# Patient Record
Sex: MALE | Race: BLACK OR AFRICAN AMERICAN
[De-identification: names, ages, dates, MRNs, and addresses within clinical notes are randomized per-mention and may not be internally consistent; named-entity substitution may affect disease eponyms.]

---

## 2017-03-24 NOTE — EMERGENCY ROOM REPORT
History of Present Illness


General


Chief Complaint:  Dyspnea/Respdistress


Source:  Patient





Present Illness


HPI


The patient presents with dyspnea.  He has a history of COPD and has been 

coughing and short of breath.  Does complain about some substernal chest 

pressure 2/10, not radiate.  He states he took a handful of all his medications 

including his diabetes medicine this morning.  He feels a little bit better at 

this time.  He has a dry mouth and feels dizzy when he stands.  Not on home O2.





The patient is taking prednisone 5 mg and they've been unable to taper down 

lower than that.  There is trouble controlling his blood sugar when he takes 

higher doses but needs to do this for treatment of his COPD.  He does have a 

nebulizer at home.





No fevers or chills.  No NVD.  No rashes.  No joint pain, headache.  No 

depression.





Some dysuria - feels like he needs to urinate but produces little urine.  No 

hematuria.


Allergies:  


Coded Allergies:  


     SHARK LIVER OIL (Unverified  Allergy, Unknown, 8/6/16)


Uncoded Allergies:  


     SHARK (Allergy, Mild, RASH, 4/11/16)





Patient History


Past Medical History:  see triage record


Social History:  Reports: smoking - prior


Social History Narrative


at home


Reviewed Nursing Documentation:  PMH: Agreed, PSxH: Agreed





Nursing Documentation-PMH


Hx Cardiac Problems:  Yes


Hx Asthma:  Yes


Hx COPD:  Yes


Hx Diabetes:  Yes


Hx Cancer:  No


Hx Gastrointestinal Problems:  Yes - IBS


Hx Neurological Problems:  No


Hx Cerebrovascular Accident:  No - shingles





Review of Systems


All Other Systems:  negative except mentioned in HPI





Physical Exam





Vital Signs








  Date Time  Temp Pulse Resp B/P Pulse Ox O2 Delivery O2 Flow Rate FiO2


 


3/24/17 11:30 97.5 96 22 93/65 96 Room Air  


 


3/24/17 11:46        40








Sp02 EP Interpretation:  reviewed, normal


General Appearance:  well appearing, no apparent distress, GCS 15


Head:  normocephalic


Eyes:  bilateral eye PERRL, bilateral eye normal inspection


ENT:  dry mucus membranes - slightly dry


Neck:  supple


Respiratory:  chest non-tender, no respiratory distress, wheezing, expiration


Cardiovascular #1:  regular rate, rhythm


Cardiovascular #2:  2+ radial (R)


Gastrointestinal:  normal inspection, normal bowel sounds, non tender, no mass, 

non-distended


Musculoskeletal:  back normal, gait/station normal, normal range of motion


Neurologic:  alert, oriented x3, grossly normal


Psychiatric:  mood/affect normal


Skin:  normal inspection, warm/dry, other - plethoric





Medical Decision Making


Diagnostic Impression:  


 Primary Impression:  


 COPD (chronic obstructive pulmonary disease)


 Qualified Codes:  J44.1 - Chronic obstructive pulmonary disease with (acute) 

exacerbation


 Additional Impressions:  


 Diabetes


 Qualified Codes:  E11.9 - Type 2 diabetes mellitus without complications


 UTI (urinary tract infection)


 Qualified Codes:  N30.00 - Acute cystitis without hematuria


ER Course


Patient presents with chest pain dyspnea and wheezing.  Differential includes 

acute myocardial infarction, acute coronary syndrome, COPD exacerbation, 

pneumonia, dehydration and hyperglycemia.  Emergent evaluation EKG chest x-ray 

and labs be undertaken.  In addition the patient will receive IV hydration, 

salmeterol and breathing treatments.





Labs with normal WBC.  Pyuria.  EKG no acute injury.  Normal troponin.





Antibiotics begun for UTI.





The patient still is dyspneic but improved.  Patient needs observation after 

discussion with Dr. Hernandez led to his request we admit the patient to the hospital.





Discussed with Dr. Martinez.





Laboratory Tests








Test


  3/24/17


12:16 3/24/17


12:52


 


Urine Color Yellow   


 


Urine Appearance Clear   


 


Urine pH 5 (4.5-8.0)   


 


Urine Specific Gravity


  1.025


(1.005-1.035) 


 


 


Urine Protein


  Negative


(NEGATIVE) 


 


 


Urine Glucose (UA)


  Negative


(NEGATIVE) 


 


 


Urine Ketones


  Negative


(NEGATIVE) 


 


 


Urine Occult Blood


  Negative


(NEGATIVE) 


 


 


Urine Nitrite


  Positive


(NEGATIVE)  H 


 


 


Urine Bilirubin


  Negative


(NEGATIVE) 


 


 


Urine Urobilinogen


  Normal MG/DL


(0.0-1.0) 


 


 


Urine Leukocyte Esterase


  3+ (NEGATIVE)


H 


 


 


Urine RBC


  0-2 /HPF (0 -


0)  H 


 


 


Urine WBC


  15-20 /HPF (0


- 0)  H 


 


 


Urine Squamous Epithelial


Cells Occasional


/LPF 


 


 


Urine Bacteria


  Many /HPF


(NONE)  H 


 


 


White Blood Count


  


  4.1 K/UL


(4.8-10.8)  L


 


Red Blood Count


  


  4.80 M/UL


(4.70-6.10)


 


Hemoglobin


  


  15.0 G/DL


(14.2-18.0)


 


Hematocrit


  


  44.9 %


(42.0-52.0)


 


Mean Corpuscular Volume  94 FL (80-99)  


 


Mean Corpuscular Hemoglobin


  


  31.1 PG


(27.0-31.0)  H


 


Mean Corpuscular Hemoglobin


Concent 


  33.3 G/DL


(32.0-36.0)


 


Red Cell Distribution Width


  


  12.4 %


(11.6-14.8)


 


Platelet Count


  


  257 K/UL


(150-450)


 


Mean Platelet Volume


  


  6.5 FL


(6.5-10.1)


 


Neutrophils (%) (Auto)


  


  44.9 %


(45.0-75.0)  L


 


Lymphocytes (%) (Auto)


  


  34.4 %


(20.0-45.0)


 


Monocytes (%) (Auto)


  


  13.1 %


(1.0-10.0)  H


 


Eosinophils (%) (Auto)


  


  6.2 %


(0.0-3.0)  H


 


Basophils (%) (Auto)


  


  1.4 %


(0.0-2.0)


 


Prothrombin Time


  


  10.2 SEC


(9.30-11.50)


 


Prothrombin Time INR  1.0 (0.9-1.1)  


 


PTT


  


  30 SEC (23-33)


 


 


Sodium Level


  


  142 mEQ/L


(135-145)


 


Potassium Level


  


  4.0 mEQ/L


(3.4-4.9)


 


Chloride Level


  


  101 mEQ/L


()


 


Carbon Dioxide Level


  


  26 mEQ/L


(20-30)


 


Anion Gap  15 (5-15)  


 


Blood Urea Nitrogen


  


  7 mg/dL (7-23)


 


 


Creatinine


  


  1.0 mg/dL


(0.7-1.2)


 


Estimate Glomerular


Filtration Rate 


  > 60 mL/min


(>60)


 


Glucose Level


  


  171 mg/dL


()  H


 


Lactic Acid Level


  


  2.70 mmol/L


(0.66-2.22)  H


 


Calcium Level


  


  9.4 mg/dL


(8.6-10.2)


 


Total Bilirubin


  


  < 0.2 mg/dL


(0.0-1.2)


 


Aspartate Amino Transferase


(AST) 


  37 U/L (5-40)  


 


 


Alanine Aminotransferase (ALT)  38 U/L (3-41)  


 


Alkaline Phosphatase


  


  53 U/L


()


 


Total Creatine Kinase


  


  83 U/L


()


 


Troponin I


  


  < 0.30 ng/mL


(<=0.30)


 


Pro-B-Type Natriuretic Peptide


  


  141 pg/mL


(0-125)  H


 


Total Protein


  


  6.6 g/dL


(6.6-8.7)


 


Albumin


  


  3.4 g/dL


(3.5-5.2)  L


 


Globulin  3.2 g/dL  


 


Albumin/Globulin Ratio  1.0 (1.0-2.7)  








Microbiology








 Date/Time


Source Procedure


Growth Status


 


 


 3/24/17 12:52


Nasal Nares Influenza Types A,B Antigen (SHANELL) - Final Complete








EKG Diagnostic Results


Rate:  normal


Rhythm:  NSR


ST Segments:  no acute changes - bifascicular block





Rhythm Strip Diag. Results


EP Interpretation:  yes


Rhythm:  NSR, no PVC's, no ectopy





Chest X-Ray Diagnostic Results


EP Interpretation:  Yes


Findings:  no effusion, no pneumothorax, other - scoleosis, COPD


Number of Views:  1





Last Vital Signs








  Date Time  Temp Pulse Resp B/P Pulse Ox O2 Delivery O2 Flow Rate FiO2


 


3/24/17 16:18 97.5 67 21 118/78 98 Room Air  40








Status:  improved


Disposition:  ADMITTED AS INPATIENT


Condition:  Serious


Referrals:  


MENA HERNANDEZ (PCP)











Robert De La Fuente M.D. Mar 24, 2017 14:41

## 2017-03-25 NOTE — HISTORY AND PHYSICAL
History of Present Illness


General


Reason for Hospitalization:  Dyspnea/Respdistress





Present Illness


Allergies:  


Coded Allergies:  


     SHARK LIVER OIL (Unverified  Allergy, Unknown, 8/6/16)


Uncoded Allergies:  


     SHARK (Allergy, Mild, RASH, 4/11/16)





Medication History


Scheduled


Amantadine Hcl* (Symmetrel*), 100 MG ORAL DAILY, (Reported)


Aspirin* (Aspirin*), 81 MG ORAL DAILY, (Reported)


Clarithromycin (Clarithromycin), 500 MG ORAL TWICE A DAY, (Reported)


Cyclobenzaprine Hcl (Amrix), 15 MG ORAL DAILY, (Reported)


Dutasteride (Avodart), 0.5 MG ORAL QHS, (Reported)


Escitalopram Oxalate* (Lexapro*), 20 MG ORAL DAILY, (Reported)


Furosemide* (Lasix*), 20 MG ORAL DAILY, (Reported)


Gabapentin* (Gabapentin*), 600 MG ORAL THREE TIMES A DAY, (Reported)


Glycopyrrolate/Formoterol Fum (Bevespi Aerosphere Inhaler), 2 PUFFS IH BID, (

Reported)


Insulin Degludec (Tresiba Flextouch U-200), 30 UNIT SQ QHS, (Reported)


Linaclotide (Linzess), 145 MCG PO DAILY, (Reported)


Metformin Hcl (Metformin Hcl Er), 500 MG ORAL DAILY, (Reported)


Metoprolol Tartrate* (Metoprolol Tartrate*), 25 MG ORAL DAILY, (Reported)


Omeprazole (Omeprazole), 20 MG ORAL DAILY, (Reported)


Potassium Chloride* (K-Dur*), 10 MEQ ORAL DAILY, (Reported)


Prednisone (Prednisone), 5 MG PO DAILY, (Reported)


Roflumilast (Daliresp), 500 MCG PO DAILY, (Reported)


Suvorexant (Belsomra), 20 MG PO QHS, (Reported)


Tamsulosin Hcl (Tamsulosin Hcl*), 0.8 MG ORAL BEDTIME, (Reported)


Theophylline Anhydrous (Theophylline Anhydrous), 300 MG PO TID, (Reported)





Scheduled PRN


Albuterol Sulfate (Ventolin Hfa), 2 PUFFS INH EVERY 6 HOURS PRN for Shortness 

of Breath, (Reported)


Aspirin/Acetaminophen/Caffeine (Excedrin Extra Strength Caplet), 2 TAB PO DAILY 

PRN for For Pain, (Reported)


Insulin Aspart (Novolog Flexpen), SQ TID PRN for SLIDING SCALE, (Reported)


Mometasone Furoate (Nasonex), 2 SPRAYS NASAL DAILY PRN for ALLERGIES, (Reported)


Oxycodone Hcl* (Roxicodone*), 30 MG ORAL Q8HR PRN for FOR PAIN (SEE PT COMMENTS)

, (Reported)





Discontinued Medications


Insulin Glargine (Lantus), 10 SUBQ BEDTIME, (Reported)


   Discontinued Reason: Medication dose changed


Montelukast Sodium* (Singulair*), 10 MG PO DAILY, (Reported)


   Discontinued Reason: Pt stopped taking med


Prednisone* (Prednisone*), 10 MG ORAL DAILY, (Reported)


   Discontinued Reason: Prescription changed


Simvastatin (Zocor), 80 MG ORAL BEDTIME, (Reported)


   Discontinued Reason: Pt stopped taking med


Theophylline (Theophylline Anhydrous), 300 MG PO THREE TIMES A DAY, (Reported)


   Discontinued Reason: Prescription changed


Tiotropium Bromide* (Spiriva*), Unknown Dose INH DAILY, (Reported)


   Discontinued Reason: Pt stopped taking med


Zolpidem Tartrate* (Ambien*), 10 MG ORAL HS PRN for Insomnia, (Reported)


   Discontinued Reason: Pt stopped taking med





Patient History


Healthcare decision maker





Resuscitation status


Full Code


Advanced Directive on File


No





Physical Exam





Last 24 Hour Vital Signs








  Date Time  Temp Pulse Resp B/P Pulse Ox O2 Delivery O2 Flow Rate FiO2


 


3/25/17 19:00 97.2 73 20 132/88 98 Room Air  


 


3/25/17 16:00 97.5 78 20 139/82 99 Room Air  


 


3/25/17 14:30  73 16  95 Room Air  21


 


3/25/17 11:28 97.8 79 20 126/80 97 Room Air  


 


3/25/17 11:20  68 16  97 Room Air  21


 


3/25/17 09:24  74  122/83    


 


3/25/17 08:04 97.6 74 19 122/83 99 Room Air  


 


3/25/17 07:53  79 17   Room Air  21


 


3/25/17 07:53  79 17  93 Room Air  21


 


3/25/17 07:53        21


 


3/25/17 04:00 97.2 73 22 131/78 99 Room Air  


 


3/25/17 00:00 97.7 75 20 126/81 97 Room Air  

















Intake and Output  


 


 3/24/17 3/25/17





 19:00 07:00


 


Intake Total  470.0 ml


 


Output Total 20 ml 725 ml


 


Balance -20 ml -255.0 ml


 


  


 


Intake Oral  360 ml


 


IV Total  110.0 ml


 


Output Urine Total 20 ml 725 ml


 


# Voids  3








Height (Feet):  5


Height (Inches):  11.00


Weight (Pounds):  170


Medications





Current Medications








 Medications


  (Trade)  Dose


 Ordered  Sig/Amina


 Route


 PRN Reason  Start Time


 Stop Time Status Last Admin


Dose Admin


 


 Albuterol/


 Ipratropium


  (DuoNeb


 0.5-3(2.5)mg/3ml)  3 ml  Q4H  PRN


 HHN


 dyspnea  3/24/17 21:00


 3/29/17 20:59   


 


 


 Amantadine HCl


  (Symmetrel)  100 mg  DAILY


 ORAL


   3/25/17 09:00


 4/24/17 08:59  3/25/17 09:23


 


 


 Dextrose


  (Dextrose 50%)    STAT  PRN


 IV


 Hypoglycemia  3/24/17 21:00


 4/23/17 20:59   


 


 


 Furosemide


  (Lasix)  20 mg  DAILY


 ORAL


   3/25/17 09:00


 4/24/17 08:59  3/25/17 09:24


 


 


 Heparin Sodium


  (Porcine)


  (Heparin 5000


 units/ml)  5,000 units  EVERY 12  HOURS


 SUBQ


   3/24/17 22:00


 4/23/17 21:59  3/25/17 21:14


 


 


 Insulin Aspart


  (NovoLOG)    BEFORE MEALS AND  HS


 SUBQ


   3/24/17 23:00


 4/23/17 22:59  3/25/17 21:25


 


 


 Ketorolac


 Tromethamine


  (Toradol 30mg)  30 mg  Q8H  PRN


 IV


 moderate pain 4-6  3/24/17 21:00


 3/29/17 20:59   


 


 


 Lorazepam


  (Ativan 2mg/ml


 1ml)  0.5 mg  Q4H  PRN


 IV


 For Anxiety  3/24/17 21:00


 3/31/17 20:59   


 


 


 Methylprednisolone


 Sodium Succinate


  (Solu-MEDROL)  60 mg  EVERY 6  HOURS


 IV


   3/25/17 00:00


 4/24/17 00:00  3/25/17 17:04


 


 


 Metoprolol


 Tartrate


  (Lopressor)  25 mg  DAILY


 ORAL


   3/25/17 09:00


 4/24/17 08:59  3/25/17 09:24


 


 


 Morphine Sulfate


  (Morphine


 Sulfate)  2 mg  Q4H  PRN


 IVP


 severe pain 7-10  3/24/17 21:00


 3/31/17 20:59   


 


 


 Nitroglycerin


  (Ntg)  0.4 mg  Q5M X 3 DOSES PRN


 SL


 Prn Chest Pain  3/24/17 21:00


 4/23/17 20:59   


 


 


 Ondansetron HCl


  (Zofran)  4 mg  Q6H  PRN


 IVP


 Nausea & Vomiting  3/24/17 21:00


 4/23/17 20:59   


 


 


 Piperacillin Sod/


 Tazobactam Sod/


 Dextrose


  (Zosyn/D5W)  110 ml @ 


 27.5 mls/hr  EVERY 8  HOURS


 IVPB


   3/24/17 23:00


 3/31/17 22:59  3/25/17 21:12


 


 


 Promethazine HCl/


 Codeine


  (Phenergan with


 Codeine)  5 ml  Q6H  PRN


 ORAL


 cough  3/24/17 21:00


 4/23/17 20:59   


 


 


 Tamsulosin HCl


  (Flomax)  0.8 mg  BEDTIME


 ORAL


   3/24/17 22:00


 4/23/17 21:59  3/25/17 21:11


 


 


 Temazepam 15 mg  15 mg  HSPRN  PRN


 ORAL


 Insomnia  3/24/17 21:00


 3/31/17 20:59  3/25/17 21:31


 


 


 Theophylline


  (Shreyas-Dur)  100 mg  EVERY 12  HOURS


 ORAL


   3/24/17 22:00


 4/23/17 21:59  3/25/17 21:11


 

















BARB SUE Mar 25, 2017 21:35

## 2017-03-25 NOTE — CARDIOLOGY PROGRESS NOTE
Assessment/Plan


Assessment/Plan


The patient is seen and examined, full consult note will be dictated.





Objective





Last 24 Hour Vital Signs








  Date Time  Temp Pulse Resp B/P Pulse Ox O2 Delivery O2 Flow Rate FiO2


 


3/25/17 16:00 97.5 78 20 139/82 99 Room Air  


 


3/25/17 14:30  73 16  95 Room Air  21


 


3/25/17 11:28 97.8 79 20 126/80 97 Room Air  


 


3/25/17 11:20  68 16  97 Room Air  21


 


3/25/17 09:24  74  122/83    


 


3/25/17 08:04 97.6 74 19 122/83 99 Room Air  


 


3/25/17 07:53  79 17   Room Air  21


 


3/25/17 07:53  79 17  93 Room Air  21


 


3/25/17 07:53        21


 


3/25/17 04:00 97.2 73 22 131/78 99 Room Air  


 


3/25/17 00:00 97.7 75 20 126/81 97 Room Air  


 


3/24/17 20:00 97.5 75 16 134/93 99 Room Air  

















Intake and Output  


 


 3/24/17 3/25/17





 19:00 07:00


 


Intake Total  470.0 ml


 


Output Total 20 ml 725 ml


 


Balance -20 ml -255.0 ml


 


  


 


Intake Oral  360 ml


 


IV Total  110.0 ml


 


Output Urine Total 20 ml 725 ml


 


# Voids  3











Microbiology








 Date/Time


Source Procedure


Growth Status


 


 


 3/24/17 12:52


Nasal Nares Influenza Types A,B Antigen (SHANELL) - Final Complete


 


 3/24/17 12:16


Urine,Clean Catch Urine Culture - Preliminary


Gram Negative Romulo Resulted

















JOE JAMES Mar 25, 2017 19:17

## 2017-03-26 NOTE — CARDIOLOGY REPORT
--------------- APPROVED REPORT --------------





EKG Measurement

Heart [id number]

OH 

[id number]

[id number]

[id number]

[id number]





Normal sinus rhythm

Right bundle branch block

Left anterior fascicular block

*** Bifascicular block ***

Abnormal ECG

## 2017-03-26 NOTE — HISTORY AND PHYSICAL REPORT
DATE OF ADMISSION:  2017



PRIMARY DOCTOR:  Bonifacio Hernandez M.D.



REASON FOR ADMISSION:  COPD with fever and shortness of breath.



HISTORY OF PRESENT ILLNESS:  The patient is a very pleasant, elderly

 male with past medical history significant for history of

COPD, TIA, dyslipidemia, DJD, hypertension, who has presented to the

emergency room complaining of increasing shortness of breath.  Apparently,

the patient misses medication for theophylline.  His theophylline was

missing from his medication refill. He started having shortness of breath

thought a couple of days ago, got worse on the day of admission.  Patient

tried handheld nebulizer at home without improvement.  She today presented

to the emergency room complaining of shortness of breath, cough, also

nasal congestion and consequently was admitted with a diagnosis of COPD

exacerbation.  I was called for admission.



PAST MEDICAL HISTORY:  Includin. Hypertension.

2. TIA.

3. Diabetes.

4. Peripheral vascular disease.

5. Osteomyelitis.

6. History of cervical and lumbar radiculopathy.

7. History of depression.



PAST SURGICAL HISTORY:  None.



SOCIAL HISTORY:  Quit smoking many years ago.  There is no history of

alcohol or drug use.



FAMILY HISTORY:  Noncontributory.



MEDICATIONS:  Includin. Amantadine 100 mg p.o. daily.

2. Lasix 20 mg p.o. daily.

3. Metoprolol 25 mg by mouth twice a day.

4. Flomax 0.8 mg by mouth daily.

5. Theophylline 100 mg p.o. daily.

6. Albuterol and Atrovent as needed pain.

7. _____ 80 mg by mouth daily.

8. Lorazepam 0.5 mg p.o. daily.

9. Morphine sulfate 2 g IV q.4 hours as needed pain.

10. Promethazine 5 mL as needed cough.

11. Restoril 15 mg by mouth at night.



FAMILY HISTORY:  Noncontributory.



REVIEW OF SYSTEMS:  General:  Denies any weight loss, weight gain,

fever, chills, or night sweats.  Head And Neck:  Denies any dysphagia,

odynophagia, blurry vision, headache, or neck stiffness.  Pulmonary:

Complained of shortness of breath, cough, and nasal congestion.

Complained of wheezing.  Denies any hemoptysis.  Cardiovascular:

Complained of midsternal chest pain, 2/10, not radiating was associated

with shortness of breath.  Gastrointestinal:  Complained of decreased

appetite.  No melena and no hematemesis or hematochezia.  Genitourinary:

Denies any dysuria, frequency, or hematuria.  Musculoskeletal:  He denies

any weakness or numbness.



PHYSICAL EXAMINATION:

VITAL SIGNS:  The patient had temperature of 97 degrees, pulse of 74,

blood pressure 122/83, respiratory rate of 19.

HEAD AND NECK:  No JVP.  No LAD.  No thyromegaly.  Extraocular

movement intact.  Pupils are reactive to light and accommodation.

LUNGS:  He has bilateral wheezing and rhonchi.

CARDIAC:  Regular rate and rhythm.  S1, S2, no murmur.  No rub.

ABDOMEN:  Soft, nontender, and nondistended.

EXTREMITIES:  No edema.  No clubbing.  No cyanosis.



LABORATORY VALUES:  The patient has WBC count of 4.1, hemoglobin of

15, hematocrit of 44, and platelet count of 267,000.  Chemistry reveals

sodium 142, potassium 4, 104 chloride, 26 bicarbonate.  BUN of 7

creatinine 1 and glucose of 171.  AST of 37, ALT of 38, alkaline

phosphatase of 53, , total protein of 6.6, albumin of 3.3.  UA

revealed specific gravity of 10.5, pH of 6, nitrite positive, leukocyte

esterase positive, WBC 15 to 20, and RBC of 0 to 2, bacteria many.  The

patient had a chest x-ray, which revealed no acute process.



ASSESSMENT:

1. Acute chronic obstructive pulmonary disease exacerbation.

2. Urinary tract infection.

3. Acute coronary syndrome, mild chest pain.

4. Hypertension, well controlled.

5. Diabetes.

6. History of severe osteoarthritis.



PLAN:  I would restart the patient on IV antibiotics for possible

urinary tract infection was also considered.  Start the patient on

steroids.  Pulmonology consultation and Cardiology consultation.  Restart

the patient on medication and check the postvoiding residual volume.









  ______________________________________________

  Nisha Smith M.D.





DR:  Won

D:  2017 16:59

T:  2017 04:54

JOB#:  [id number]

CC:

## 2017-03-26 NOTE — NEPHROLOGY PROGRESS NOTE
Assessment/Plan


Assessment


1. Acute chronic obstructive pulmonary disease exacerbation.


2. Urinary tract infection.


3. Acute coronary syndrome, mild chest pain.


4. Hypertension, well controlled.


5. Diabetes.


6. History of severe osteoarthritis.


Plan


plan to 


continue iv antibiotic


continue steroid 


continue lasix 


breathing treatment 


monitoring electrolyte





Subjective


Constitutional:  Reports: malaise, weakness


HEENT:  Reports: no symptoms


Genitourinary:  Reports: no symptoms


Neurologic/Psychiatric:  Reports: no symptoms





Objective


Objective





Last 24 Hour Vital Signs








  Date Time  Temp Pulse Resp B/P Pulse Ox O2 Delivery O2 Flow Rate FiO2


 


3/26/17 11:54 98.4 116 22 135/56 95 Nasal Cannula 2.0 


 


3/26/17 09:32  76  132/75    


 


3/26/17 08:19 97.7 76 20 132/75 99   


 


3/26/17 04:00 97.5 74 18 136/81 97 Nasal Cannula 2.0 


 


3/26/17 02:47        28


 


3/26/17 02:45  77 16  100 Nasal Cannula 2.0 28


 


3/26/17 00:00 97.5 81 20 133/88 96 Room Air  


 


3/25/17 19:00 97.2 73 20 132/88 98 Room Air  

















Intake and Output  


 


 3/25/17 3/26/17





 19:00 07:00


 


Intake Total 670.0 ml 402.5 ml


 


Output Total 500 ml 


 


Balance 170.0 ml 402.5 ml


 


  


 


Intake Oral 450 ml 320 ml


 


IV Total 220.0 ml 82.5 ml


 


Output Urine Total 500 ml 


 


# Voids  6


 


# Bowel Movements 5 4








Height (Feet):  5


Height (Inches):  11.00


Weight (Pounds):  170


Objective


HEAD AND NECK:  No JVP.  No LAD.  No thyromegaly.  Extraocular


movement intact.  Pupils are reactive to light and accommodation.


LUNGS:  He has bilateral wheezing and rhonchi.


CARDIAC:  Regular rate and rhythm.  S1, S2, no murmur.  No rub.


ABDOMEN:  Soft, nontender, and nondistended.


EXTREMITIES:  No edema.  No clubbing.  No cyanosis.











JOSE MOROCHO Mar 26, 2017 16:09

## 2017-03-26 NOTE — PULMONOLOGY PROGRESS NOTE
Subjective


Allergies:  


Coded Allergies:  


     SHARK LIVER OIL (Unverified  Allergy, Unknown, 8/6/16)


Uncoded Allergies:  


     SHARK (Allergy, Mild, RASH, 4/11/16)





Objective





Last 24 Hour Vital Signs








  Date Time  Temp Pulse Resp B/P Pulse Ox O2 Delivery O2 Flow Rate FiO2


 


3/26/17 19:00 97.0 75 20 151/83 97 Room Air  


 


3/26/17 16:00 97.2 74 20 153/90 94 Room Air  


 


3/26/17 11:54 98.4 116 22 135/56 95 Nasal Cannula 2.0 


 


3/26/17 09:32  76  132/75    


 


3/26/17 08:19 97.7 76 20 132/75 99   


 


3/26/17 04:00 97.5 74 18 136/81 97 Nasal Cannula 2.0 


 


3/26/17 02:47        28


 


3/26/17 02:45  77 16  100 Nasal Cannula 2.0 28


 


3/26/17 00:00 97.5 81 20 133/88 96 Room Air  

















Intake and Output  


 


 3/25/17 3/26/17





 19:00 07:00


 


Intake Total 670.0 ml 402.5 ml


 


Output Total 500 ml 


 


Balance 170.0 ml 402.5 ml


 


  


 


Intake Oral 450 ml 320 ml


 


IV Total 220.0 ml 82.5 ml


 


Output Urine Total 500 ml 


 


# Voids  6


 


# Bowel Movements 5 4











Microbiology








 Date/Time


Source Procedure


Growth Status


 


 


 3/25/17 07:00


Sputum Gram Stain - Final Resulted


 


 3/25/17 07:00 Sputum Culture - Preliminary


Gram Negative Bacillus 1 Resulted


 


 3/24/17 12:52


Nasal Nares Influenza Types A,B Antigen (SHANELL) - Final Complete


 


 3/24/17 12:16


Urine,Clean Catch Urine Culture - Final


Klebsiella Pneumoniae Complete











Current Medications








 Medications


  (Trade)  Dose


 Ordered  Sig/Amina


 Route


 PRN Reason  Start Time


 Stop Time Status Last Admin


Dose Admin


 


 Albuterol/


 Ipratropium


  (DuoNeb


 0.5-3(2.5)mg/3ml)  3 ml  Q4H  PRN


 HHN


 dyspnea  3/24/17 21:00


 3/29/17 20:59  3/26/17 02:45


 


 


 Amantadine HCl


  (Symmetrel)  100 mg  DAILY


 ORAL


   3/25/17 09:00


 4/24/17 08:59  3/26/17 09:32


 


 


 Dextrose


  (Dextrose 50%)    STAT  PRN


 IV


 Hypoglycemia  3/24/17 21:00


 4/23/17 20:59   


 


 


 Furosemide


  (Lasix)  20 mg  DAILY


 ORAL


   3/25/17 09:00


 4/24/17 08:59  3/26/17 09:31


 


 


 Heparin Sodium


  (Porcine)


  (Heparin 5000


 units/ml)  5,000 units  EVERY 12  HOURS


 SUBQ


   3/24/17 22:00


 4/23/17 21:59  3/26/17 21:22


 


 


 Insulin Aspart


  (NovoLOG)    BEFORE MEALS AND  HS


 SUBQ


   3/24/17 23:00


 4/23/17 22:59  3/26/17 21:23


 


 


 Ketorolac


 Tromethamine


  (Toradol 30mg)  30 mg  Q8H  PRN


 IV


 moderate pain 4-6  3/24/17 21:00


 3/29/17 20:59   


 


 


 Lorazepam


  (Ativan 2mg/ml


 1ml)  0.5 mg  Q4H  PRN


 IV


 For Anxiety  3/24/17 21:00


 3/31/17 20:59   


 


 


 Methylprednisolone


 Sodium Succinate


  (Solu-MEDROL)  60 mg  EVERY 6  HOURS


 IV


   3/25/17 00:00


 4/24/17 00:00  3/26/17 18:42


 


 


 Metoprolol


 Tartrate


  (Lopressor)  25 mg  DAILY


 ORAL


   3/25/17 09:00


 4/24/17 08:59  3/26/17 09:32


 


 


 Morphine Sulfate


  (Morphine


 Sulfate)  2 mg  Q4H  PRN


 IVP


 severe pain 7-10  3/24/17 21:00


 3/31/17 20:59   


 


 


 Nitroglycerin


  (Ntg)  0.4 mg  Q5M X 3 DOSES PRN


 SL


 Prn Chest Pain  3/24/17 21:00


 4/23/17 20:59   


 


 


 Ondansetron HCl


  (Zofran)  4 mg  Q6H  PRN


 IVP


 Nausea & Vomiting  3/24/17 21:00


 4/23/17 20:59   


 


 


 Piperacillin Sod/


 Tazobactam Sod/


 Dextrose


  (Zosyn/D5W)  110 ml @ 


 27.5 mls/hr  EVERY 8  HOURS


 IVPB


   3/24/17 23:00


 3/31/17 22:59  3/26/17 21:21


 


 


 Promethazine HCl/


 Codeine


  (Phenergan with


 Codeine)  5 ml  Q6H  PRN


 ORAL


 cough  3/24/17 21:00


 4/23/17 20:59   


 


 


 Tamsulosin HCl


  (Flomax)  0.8 mg  BEDTIME


 ORAL


   3/24/17 22:00


 4/23/17 21:59  3/26/17 21:26


 


 


 Temazepam 15 mg  15 mg  HSPRN  PRN


 ORAL


 Insomnia  3/24/17 21:00


 3/31/17 20:59  3/25/17 21:31


 


 


 Theophylline


  (Shreyas-Dur)  100 mg  EVERY 12  HOURS


 ORAL


   3/24/17 22:00


 4/23/17 21:59  3/26/17 21:26


 

















BARB SUE Mar 26, 2017 22:41

## 2017-03-27 NOTE — CARDIOLOGY PROGRESS NOTE
Assessment/Plan


Assessment/Plan


1. Hypotension, medication induced with pre-syncopal event, continue hydration


2. Acute exacerbation of COPD


3. PAD


4. DM


5. Hx of TIA, start ASA and statins.





Subjective


Subjective


Denies any chest pain, dizziness.


SOB with minimal activities





Objective





Last 24 Hour Vital Signs








  Date Time  Temp Pulse Resp B/P Pulse Ox O2 Delivery O2 Flow Rate FiO2


 


3/26/17 19:00 97.0 75 20 151/83 97 Room Air  


 


3/26/17 16:00 97.2 74 20 153/90 94 Room Air  


 


3/26/17 11:54 98.4 116 22 135/56 95 Nasal Cannula 2.0 


 


3/26/17 09:32  76  132/75    


 


3/26/17 08:19 97.7 76 20 132/75 99   


 


3/26/17 04:00 97.5 74 18 136/81 97 Nasal Cannula 2.0 


 


3/26/17 02:47        28


 


3/26/17 02:45  77 16  100 Nasal Cannula 2.0 28

















Intake and Output  


 


 3/26/17 3/27/17





 19:00 07:00


 


Intake Total 960 ml 320 ml


 


Balance 960 ml 320 ml


 


  


 


Intake Oral 960 ml 320 ml


 


# Voids 3 4











Microbiology








 Date/Time


Source Procedure


Growth Status


 


 


 3/25/17 07:00


Sputum Gram Stain - Final Resulted


 


 3/25/17 07:00 Sputum Culture - Preliminary


Gram Negative Bacillus 1 Resulted


 


 3/24/17 12:52


Nasal Nares Influenza Types A,B Antigen (SHANELL) - Final Complete


 


 3/24/17 12:16


Urine,Clean Catch Urine Culture - Final


Klebsiella Pneumoniae Complete








Objective


HEAD AND NECK:   No thyromegaly.  Extraocular


movement intact.  Pupils are reactive to light and accommodation.


NECK: No JVP, no carotid bruit, upstroke 2+ B/L


LUNGS:  He has bilateral wheezing and rhonchi.  


CARDIAC:  Regular rate and rhythm.  Normal S1, S2, no murmur, gallops or rubs.


ABDOMEN:  Soft, nontender, and nondistended, + BS


EXTREMITIES:  No edema, clubbing or cyanosis.











JOE JAMES Mar 27, 2017 00:11

## 2017-03-27 NOTE — NEPHROLOGY PROGRESS NOTE
Assessment/Plan


Assessment


1. Acute chronic obstructive pulmonary disease exacerbation.


2. Urinary tract infection.


3. Acute coronary syndrome, mild chest pain.


4. Hypertension, well controlled.


5. Diabetes.


6. History of severe osteoarthritis.


Plan


plan to 


continue iv antibiotic


continue steroid 


continue lasix 


breathing treatment 


monitoring electrolyte





Subjective


Constitutional:  Reports: no symptoms


HEENT:  Reports: no symptoms


Genitourinary:  Reports: no symptoms


Neurologic/Psychiatric:  Reports: no symptoms


Subjective


alert and wake 


feeling better 


still has cough and sob





Objective


Objective





Last 24 Hour Vital Signs








  Date Time  Temp Pulse Resp B/P Pulse Ox O2 Delivery O2 Flow Rate FiO2


 


3/27/17 14:40  78 20  98 Nasal Cannula 2.0 28


 


3/27/17 14:40  81 20  99 Nasal Cannula 2.0 28


 


3/27/17 11:48 98.4 66 21 145/91 96 Room Air  


 


3/27/17 08:54  81  129/86    


 


3/27/17 08:15 98.3 81 22 129/86 96 Room Air  


 


3/27/17 04:30    159/108    


 


3/27/17 04:00 96.8 66 20 164/107 97 Nasal Cannula 2.0 


 


3/27/17 00:23  80 20  99 Nasal Cannula 2.0 28


 


3/27/17 00:22  76 20  98 Nasal Cannula 2.0 28


 


3/27/17 00:00 97.0 72 20 162/97 98 Nasal Cannula 2.0 


 


3/26/17 19:00 97.0 75 20 151/83 97 Room Air  


 


3/26/17 16:00 97.2 74 20 153/90 94 Room Air  

















Intake and Output  


 


 3/26/17 3/27/17





 19:00 07:00


 


Intake Total 960 ml 680.0 ml


 


Output Total  700 ml


 


Balance 960 ml -20.0 ml


 


  


 


Intake Oral 960 ml 570 ml


 


IV Total  110.0 ml


 


Output Urine Total  700 ml


 


# Voids 3 4








Laboratory Tests


3/27/17 06:30: 


White Blood Count 10.6, Red Blood Count 4.75, Hemoglobin 15.1, Hematocrit 43.2, 

Mean Corpuscular Volume 91, Mean Corpuscular Hemoglobin 31.7H, Mean Corpuscular 

Hemoglobin Concent 34.9, Red Cell Distribution Width 11.6, Platelet Count 269, 

Mean Platelet Volume 6.5, Neutrophils (%) (Auto) , Lymphocytes (%) (Auto) , 

Monocytes (%) (Auto) , Eosinophils (%) (Auto) , Basophils (%) (Auto) , 

Differential Total Cells Counted 100, Neutrophils % (Manual) 94H, Lymphocytes % 

(Manual) 4L, Monocytes % (Manual) 2, Eosinophils % (Manual) 0, Basophils % (

Manual) 0, Band Neutrophils 0, Platelet Estimate Adequate, Platelet Morphology 

Normal, Red Blood Cell Morphology Normal, Sodium Level 140, Potassium Level 3.6

, Chloride Level 100, Carbon Dioxide Level 27, Anion Gap 13, Blood Urea 

Nitrogen 12, Creatinine 0.9, Estimat Glomerular Filtration Rate > 60, Glucose 

Level 213H, Calcium Level 8.6, Phosphorus Level 3.6, Magnesium Level 2.0, Total 

Bilirubin 0.3, Aspartate Amino Transf (AST/SGOT) 19, Alanine Aminotransferase (

ALT/SGPT) 27, Alkaline Phosphatase 51, Total Protein 6.3L, Albumin 3.2L, 

Globulin 3.1, Albumin/Globulin Ratio 1.0


Height (Feet):  5


Height (Inches):  11.00


Weight (Pounds):  170


Objective


HEAD AND NECK:  No JVP.  No LAD.  No thyromegaly.  Extraocular


movement intact.  Pupils are reactive to light and accommodation.


LUNGS:  He has bilateral wheezing and rhonchi.


CARDIAC:  Regular rate and rhythm.  S1, S2, no murmur.  No rub.


ABDOMEN:  Soft, nontender, and nondistended.


EXTREMITIES:  No edema.  No clubbing.  No cyanosis.











JOSE MOROCHO Mar 27, 2017 15:49

## 2017-03-27 NOTE — PULMONOLOGY PROGRESS NOTE
Subjective


Allergies:  


Coded Allergies:  


     SHARK LIVER OIL (Unverified  Allergy, Unknown, 8/6/16)


Uncoded Allergies:  


     SHARK (Allergy, Mild, RASH, 4/11/16)





Objective





Last 24 Hour Vital Signs








  Date Time  Temp Pulse Resp B/P Pulse Ox O2 Delivery O2 Flow Rate FiO2


 


3/27/17 19:00 96.4 74 20 149/86 98 Room Air  


 


3/27/17 16:00 97.7 76 18 152/92 96 Room Air  


 


3/27/17 14:40  78 20  98 Nasal Cannula 2.0 28


 


3/27/17 14:40  81 20  99 Nasal Cannula 2.0 28


 


3/27/17 11:48 98.4 66 21 145/91 96 Room Air  


 


3/27/17 08:54  81  129/86    


 


3/27/17 08:15 98.3 81 22 129/86 96 Room Air  


 


3/27/17 04:30    159/108    


 


3/27/17 04:00 96.8 66 20 164/107 97 Nasal Cannula 2.0 


 


3/27/17 00:23  80 20  99 Nasal Cannula 2.0 28


 


3/27/17 00:22  76 20  98 Nasal Cannula 2.0 28


 


3/27/17 00:00 97.0 72 20 162/97 98 Nasal Cannula 2.0 

















Intake and Output  


 


 3/26/17 3/27/17





 19:00 07:00


 


Intake Total 960 ml 680.0 ml


 


Output Total  700 ml


 


Balance 960 ml -20.0 ml


 


  


 


Intake Oral 960 ml 570 ml


 


IV Total  110.0 ml


 


Output Urine Total  700 ml


 


# Voids 3 4











Microbiology








 Date/Time


Source Procedure


Growth Status


 


 


 3/25/17 07:00


Sputum Gram Stain - Final Resulted


 


 3/25/17 07:00 Sputum Culture - Preliminary


Klebsiella Pneumoniae


Gram Negative Bacillus 1 Resulted








Laboratory Tests


3/27/17 06:30: 


White Blood Count 10.6, Red Blood Count 4.75, Hemoglobin 15.1, Hematocrit 43.2, 

Mean Corpuscular Volume 91, Mean Corpuscular Hemoglobin 31.7H, Mean Corpuscular 

Hemoglobin Concent 34.9, Red Cell Distribution Width 11.6, Platelet Count 269, 

Mean Platelet Volume 6.5, Neutrophils (%) (Auto) , Lymphocytes (%) (Auto) , 

Monocytes (%) (Auto) , Eosinophils (%) (Auto) , Basophils (%) (Auto) , 

Differential Total Cells Counted 100, Neutrophils % (Manual) 94H, Lymphocytes % 

(Manual) 4L, Monocytes % (Manual) 2, Eosinophils % (Manual) 0, Basophils % (

Manual) 0, Band Neutrophils 0, Platelet Estimate Adequate, Platelet Morphology 

Normal, Red Blood Cell Morphology Normal, Sodium Level 140, Potassium Level 3.6

, Chloride Level 100, Carbon Dioxide Level 27, Anion Gap 13, Blood Urea 

Nitrogen 12, Creatinine 0.9, Estimat Glomerular Filtration Rate > 60, Glucose 

Level 213H, Calcium Level 8.6, Phosphorus Level 3.6, Magnesium Level 2.0, Total 

Bilirubin 0.3, Aspartate Amino Transf (AST/SGOT) 19, Alanine Aminotransferase (

ALT/SGPT) 27, Alkaline Phosphatase 51, Total Protein 6.3L, Albumin 3.2L, 

Globulin 3.1, Albumin/Globulin Ratio 1.0





Current Medications








 Medications


  (Trade)  Dose


 Ordered  Sig/Amina


 Route


 PRN Reason  Start Time


 Stop Time Status Last Admin


Dose Admin


 


 Albuterol/


 Ipratropium


  (DuoNeb


 0.5-3(2.5)mg/3ml)  3 ml  Q4H  PRN


 HHN


 dyspnea  3/24/17 21:00


 3/29/17 20:59  3/27/17 14:50


 


 


 Amantadine HCl


  (Symmetrel)  100 mg  DAILY


 ORAL


   3/25/17 09:00


 4/24/17 08:59  3/27/17 09:11


 


 


 Aspirin


  (Ecotrin)  81 mg  DAILY


 ORAL


   3/27/17 09:00


 4/26/17 08:59  3/27/17 08:54


 


 


 Atorvastatin


 Calcium


  (Lipitor)  40 mg  BEDTIME


 ORAL


   3/27/17 21:00


 4/26/17 20:59  3/27/17 22:31


 


 


 Dextrose


  (Dextrose 50%)    STAT  PRN


 IV


 Hypoglycemia  3/24/17 21:00


 4/23/17 20:59   


 


 


 Heparin Sodium


  (Porcine)


  (Heparin 5000


 units/ml)  5,000 units  EVERY 12  HOURS


 SUBQ


   3/24/17 22:00


 4/23/17 21:59  3/27/17 08:59


 


 


 Insulin Aspart


  (NovoLOG)    BEFORE MEALS AND  HS


 SUBQ


   3/24/17 23:00


 4/23/17 22:59  3/27/17 16:45


 


 


 Ketorolac


 Tromethamine


  (Toradol 30mg)  30 mg  Q8H  PRN


 IV


 moderate pain 4-6  3/24/17 21:00


 3/29/17 20:59   


 


 


 Lorazepam


  (Ativan 2mg/ml


 1ml)  0.5 mg  Q4H  PRN


 IV


 For Anxiety  3/24/17 21:00


 3/31/17 20:59   


 


 


 Methylprednisolone


 Sodium Succinate


  (Solu-MEDROL)  60 mg  EVERY 6  HOURS


 IV


   3/25/17 00:00


 4/24/17 00:00  3/27/17 17:12


 


 


 Metoprolol


 Tartrate


  (Lopressor)  25 mg  DAILY


 ORAL


   3/25/17 09:00


 4/24/17 08:59  3/27/17 08:54


 


 


 Morphine Sulfate


  (Morphine


 Sulfate)  2 mg  Q4H  PRN


 IVP


 severe pain 7-10  3/24/17 21:00


 3/31/17 20:59  3/27/17 22:41


 


 


 Nitroglycerin


  (Ntg)  0.4 mg  Q5M X 3 DOSES PRN


 SL


 Prn Chest Pain  3/24/17 21:00


 4/23/17 20:59   


 


 


 Ondansetron HCl


  (Zofran)  4 mg  Q6H  PRN


 IVP


 Nausea & Vomiting  3/24/17 21:00


 4/23/17 20:59   


 


 


 Piperacillin Sod/


 Tazobactam Sod/


 Dextrose


  (Zosyn/D5W)  110 ml @ 


 27.5 mls/hr  EVERY 8  HOURS


 IVPB


   3/24/17 23:00


 3/31/17 22:59  3/27/17 22:31


 


 


 Promethazine HCl/


 Codeine


  (Phenergan with


 Codeine)  5 ml  Q6H  PRN


 ORAL


 cough  3/24/17 21:00


 4/23/17 20:59   


 


 


 Tamsulosin HCl


  (Flomax)  0.8 mg  BEDTIME


 ORAL


   3/24/17 22:00


 4/23/17 21:59  3/27/17 22:31


 


 


 Temazepam 15 mg  15 mg  HSPRN  PRN


 ORAL


 Insomnia  3/24/17 21:00


 3/31/17 20:59  3/27/17 00:12


 


 


 Theophylline


  (Shreyas-Dur)  200 mg  Q12HR


 ORAL


   3/27/17 21:00


 4/26/17 20:59  3/27/17 22:31


 

















BARB USE Mar 27, 2017 23:24

## 2017-03-27 NOTE — CARDIOLOGY PROGRESS NOTE
Assessment/Plan


Assessment/Plan


1. Hypotension, ?medication induced,  pre-syncopal event,responded to hydration

, creat down from 1.2 to 0.9.


2. Acute exacerbation of COPD


3. PAD, on ASA


4. DM, continue ASA and atorvastatin.


5. Hx of TIA, start ASA and statins.





Subjective


Subjective


Transferred from telemetry to med-surg unit.


SOB with minimal activities





Objective





Last 24 Hour Vital Signs








  Date Time  Temp Pulse Resp B/P Pulse Ox O2 Delivery O2 Flow Rate FiO2


 


3/27/17 16:00 97.7 76 18 152/92 96 Room Air  


 


3/27/17 14:40  78 20  98 Nasal Cannula 2.0 28


 


3/27/17 14:40  81 20  99 Nasal Cannula 2.0 28


 


3/27/17 11:48 98.4 66 21 145/91 96 Room Air  


 


3/27/17 08:54  81  129/86    


 


3/27/17 08:15 98.3 81 22 129/86 96 Room Air  


 


3/27/17 04:30    159/108    


 


3/27/17 04:00 96.8 66 20 164/107 97 Nasal Cannula 2.0 


 


3/27/17 00:23  80 20  99 Nasal Cannula 2.0 28


 


3/27/17 00:22  76 20  98 Nasal Cannula 2.0 28


 


3/27/17 00:00 97.0 72 20 162/97 98 Nasal Cannula 2.0 


 


3/26/17 19:00 97.0 75 20 151/83 97 Room Air  

















Intake and Output  


 


 3/26/17 3/27/17





 19:00 07:00


 


Intake Total 960 ml 680.0 ml


 


Output Total  700 ml


 


Balance 960 ml -20.0 ml


 


  


 


Intake Oral 960 ml 570 ml


 


IV Total  110.0 ml


 


Output Urine Total  700 ml


 


# Voids 3 4








2D Echo:  Echo form 2015 shows normal LV systolic function withLVEF 55%, RVSP 9 

mMHg.





Laboratory Tests








Test


  3/27/17


06:30


 


White Blood Count


  10.6 K/UL


(4.8-10.8)


 


Red Blood Count


  4.75 M/UL


(4.70-6.10)


 


Hemoglobin


  15.1 G/DL


(14.2-18.0)


 


Hematocrit


  43.2 %


(42.0-52.0)


 


Mean Corpuscular Volume 91 FL (80-99)  


 


Mean Corpuscular Hemoglobin


  31.7 PG


(27.0-31.0)  H


 


Mean Corpuscular Hemoglobin


Concent 34.9 G/DL


(32.0-36.0)


 


Red Cell Distribution Width


  11.6 %


(11.6-14.8)


 


Platelet Count


  269 K/UL


(150-450)


 


Mean Platelet Volume


  6.5 FL


(6.5-10.1)


 


Neutrophils (%) (Auto)


  % (45.0-75.0)


 


 


Lymphocytes (%) (Auto)


  % (20.0-45.0)


 


 


Monocytes (%) (Auto)  % (1.0-10.0)  


 


Eosinophils (%) (Auto)  % (0.0-3.0)  


 


Basophils (%) (Auto)  % (0.0-2.0)  


 


Differential Total Cells


Counted 100  


 


 


Neutrophils % (Manual) 94 % (45-75)  H


 


Lymphocytes % (Manual) 4 % (20-45)  L


 


Monocytes % (Manual) 2 % (1-10)  


 


Eosinophils % (Manual) 0 % (0-3)  


 


Basophils % (Manual) 0 % (0-2)  


 


Band Neutrophils 0 % (0-8)  


 


Platelet Estimate Adequate  


 


Platelet Morphology Normal  


 


Red Blood Cell Morphology Normal  


 


Sodium Level


  140 mEQ/L


(135-145)


 


Potassium Level


  3.6 mEQ/L


(3.4-4.9)


 


Chloride Level


  100 mEQ/L


()


 


Carbon Dioxide Level


  27 mEQ/L


(20-30)


 


Anion Gap 13 (5-15)  


 


Blood Urea Nitrogen


  12 mg/dL


(7-23)


 


Creatinine


  0.9 mg/dL


(0.7-1.2)


 


Estimat Glomerular Filtration


Rate > 60 mL/min


(>60)


 


Glucose Level


  213 mg/dL


()  H


 


Calcium Level


  8.6 mg/dL


(8.6-10.2)


 


Phosphorus Level


  3.6 mg/dL


(2.5-4.8)


 


Magnesium Level


  2.0 mg/dL


(1.7-2.5)


 


Total Bilirubin


  0.3 mg/dL


(0.0-1.2)


 


Aspartate Amino Transf


(AST/SGOT) 19 U/L (5-40)  


 


 


Alanine Aminotransferase


(ALT/SGPT) 27 U/L (3-41)  


 


 


Alkaline Phosphatase


  51 U/L


()


 


Total Protein


  6.3 g/dL


(6.6-8.7)  L


 


Albumin


  3.2 g/dL


(3.5-5.2)  L


 


Globulin 3.1 g/dL  


 


Albumin/Globulin Ratio 1.0 (1.0-2.7)  











Microbiology








 Date/Time


Source Procedure


Growth Status


 


 


 3/25/17 07:00


Sputum Gram Stain - Final Resulted


 


 3/25/17 07:00 Sputum Culture - Preliminary


Klebsiella Pneumoniae


Gram Negative Bacillus 1 Resulted








Objective


HEAD AND NECK:   No thyromegaly.  Extraocular


movement intact.  Pupils are reactive to light and accommodation.


NECK: No JVP, no carotid bruit, upstroke 2+ B/L


LUNGS:  He has bilateral wheezing and rhonchi, no breath sounds 


CARDIAC:  Regular rate and rhythm.  Normal S1, S2, no murmur, gallops or rubs.


ABDOMEN:  Soft, nontender, and nondistended, + BS


EXTREMITIES:  No edema, clubbing or cyanosis.











JOE JAMES Mar 27, 2017 18:53

## 2017-03-28 NOTE — PULMONOLOGY PROGRESS NOTE
Subjective


Allergies:  


Coded Allergies:  


     SHARK LIVER OIL (Unverified  Allergy, Unknown, 8/6/16)


Uncoded Allergies:  


     SHARK (Allergy, Mild, RASH, 4/11/16)





Objective





Last 24 Hour Vital Signs








  Date Time  Temp Pulse Resp B/P Pulse Ox O2 Delivery O2 Flow Rate FiO2


 


3/28/17 20:51  70 20   Room Air  21


 


3/28/17 20:50  70 20  100 Room Air  21


 


3/28/17 20:46  67 20  97 Room Air  21


 


3/28/17 16:00 98.1 72 22 148/98 95 Room Air  


 


3/28/17 15:00  75 18  100 Room Air  21


 


3/28/17 14:50  72 20  97 Room Air  21


 


3/28/17 11:39 97.7 69 21 128/73 99 Room Air  


 


3/28/17 10:40  83 20  99 Room Air  21


 


3/28/17 10:30  83 20  98 Room Air  21


 


3/28/17 08:08  71  133/72    


 


3/28/17 07:52 97.7 71 21 133/72 97 Room Air  


 


3/28/17 04:00 97.5  18 158/96 98 Room Air  


 


3/28/17 00:00 97.7  20 152/94 97 Room Air  

















Intake and Output  


 


 3/27/17 3/28/17





 19:00 07:00


 


Intake Total 822.5 ml 464.0 ml


 


Output Total  1050 ml


 


Balance 822.5 ml -586.0 ml


 


  


 


Intake Oral 720 ml 360 ml


 


IV Total 102.5 ml 104.0 ml


 


Output Urine Total  1050 ml


 


# Voids  5


 


# Bowel Movements 1 











Current Medications








 Medications


  (Trade)  Dose


 Ordered  Sig/Amina


 Route


 PRN Reason  Start Time


 Stop Time Status Last Admin


Dose Admin


 


 Albuterol/


 Ipratropium


  (DuoNeb


 0.5-3(2.5)mg/3ml)  3 ml  Q4HRT


 HHN


   3/28/17 11:00


 4/2/17 10:59  3/28/17 20:46


 


 


 Amantadine HCl


  (Symmetrel)  100 mg  DAILY


 ORAL


   3/25/17 09:00


 4/24/17 08:59  3/28/17 08:07


 


 


 Aspirin


  (Ecotrin)  81 mg  DAILY


 ORAL


   3/27/17 09:00


 4/26/17 08:59  3/28/17 08:07


 


 


 Atorvastatin


 Calcium


  (Lipitor)  40 mg  BEDTIME


 ORAL


   3/27/17 21:00


 4/26/17 20:59  3/28/17 21:31


 


 


 Dextrose


  (Dextrose 50%)    STAT  PRN


 IV


 Hypoglycemia  3/24/17 21:00


 4/23/17 20:59   


 


 


 Heparin Sodium


  (Porcine)


  (Heparin 5000


 units/ml)  5,000 units  EVERY 12  HOURS


 SUBQ


   3/24/17 22:00


 4/23/17 21:59  3/28/17 21:34


 


 


 Insulin Aspart


  (NovoLOG)    BEFORE MEALS AND  HS


 SUBQ


   3/24/17 23:00


 4/23/17 22:59  3/28/17 21:34


 


 


 Ketorolac


 Tromethamine


  (Toradol 30mg)  30 mg  Q8H  PRN


 IV


 moderate pain 4-6  3/24/17 21:00


 3/29/17 20:59   


 


 


 Lorazepam


  (Ativan 2mg/ml


 1ml)  0.5 mg  Q4H  PRN


 IV


 For Anxiety  3/24/17 21:00


 3/31/17 20:59   


 


 


 Methylprednisolone


 Sodium Succinate


  (Solu-MEDROL)  60 mg  DAILY


 IV


   3/29/17 09:00


 4/28/17 08:59   


 


 


 Metoprolol


 Tartrate


  (Lopressor)  25 mg  DAILY


 ORAL


   3/25/17 09:00


 4/24/17 08:59  3/28/17 08:08


 


 


 Morphine Sulfate


  (Morphine


 Sulfate)  2 mg  Q4H  PRN


 IVP


 severe pain 7-10  3/24/17 21:00


 3/31/17 20:59  3/28/17 15:50


 


 


 Nitroglycerin


  (Ntg)  0.4 mg  Q5M X 3 DOSES PRN


 SL


 Prn Chest Pain  3/24/17 21:00


 4/23/17 20:59   


 


 


 Ondansetron HCl


  (Zofran)  4 mg  Q6H  PRN


 IVP


 Nausea & Vomiting  3/24/17 21:00


 4/23/17 20:59   


 


 


 Piperacillin Sod/


 Tazobactam Sod/


 Dextrose


  (Zosyn/D5W)  110 ml @ 


 27.5 mls/hr  EVERY 8  HOURS


 IVPB


   3/24/17 23:00


 3/31/17 22:59  3/28/17 21:32


 


 


 Promethazine HCl/


 Codeine


  (Phenergan with


 Codeine)  5 ml  Q6H  PRN


 ORAL


 cough  3/24/17 21:00


 4/23/17 20:59   


 


 


 Tamsulosin HCl


  (Flomax)  0.8 mg  BEDTIME


 ORAL


   3/24/17 22:00


 4/23/17 21:59  3/28/17 21:31


 


 


 Temazepam 15 mg  15 mg  HSPRN  PRN


 ORAL


 Insomnia  3/24/17 21:00


 3/31/17 20:59  3/27/17 00:12


 


 


 Theophylline


  (Shreyas-Dur)  200 mg  Q12HR


 ORAL


   3/27/17 21:00


 4/26/17 20:59  3/28/17 21:31


 

















BARB SUE Mar 28, 2017 22:49

## 2017-03-28 NOTE — NEPHROLOGY PROGRESS NOTE
Assessment/Plan


Assessment


1. Acute chronic obstructive pulmonary disease exacerbation.


2. Urinary tract infection.


3. Acute coronary syndrome, mild chest pain.


4. Hypertension, well controlled.


5. Diabetes.


6. History of severe osteoarthritis.


Plan


plan to 


continue iv antibiotic


continue steroid 


continue lasix 


breathing treatment 


monitoring electrolyte





Subjective


Constitutional:  Reports: no symptoms


HEENT:  Reports: no symptoms


Genitourinary:  Reports: no symptoms


Neurologic/Psychiatric:  Reports: no symptoms


Subjective


alert and wake 


feeling better 


still has cough and sob requesting ATC albuterol





Objective


Objective





Last 24 Hour Vital Signs








  Date Time  Temp Pulse Resp B/P Pulse Ox O2 Delivery O2 Flow Rate FiO2


 


3/28/17 16:00 98.1 72 22 148/98 95 Room Air  


 


3/28/17 15:00  75 18  100 Room Air  21


 


3/28/17 14:50  72 20  97 Room Air  21


 


3/28/17 11:39 97.7 69 21 128/73 99 Room Air  


 


3/28/17 10:40  83 20  99 Room Air  21


 


3/28/17 10:30  83 20  98 Room Air  21


 


3/28/17 08:08  71  133/72    


 


3/28/17 07:52 97.7 71 21 133/72 97 Room Air  


 


3/28/17 04:00 97.5  18 158/96 98 Room Air  


 


3/28/17 00:00 97.7  20 152/94 97 Room Air  

















Intake and Output  


 


 3/27/17 3/28/17





 19:00 07:00


 


Intake Total 822.5 ml 464.0 ml


 


Output Total  1050 ml


 


Balance 822.5 ml -586.0 ml


 


  


 


Intake Oral 720 ml 360 ml


 


IV Total 102.5 ml 104.0 ml


 


Output Urine Total  1050 ml


 


# Voids  5


 


# Bowel Movements 1 








Height (Feet):  5


Height (Inches):  11.00


Weight (Pounds):  170


Objective


HEAD AND NECK:  No JVP.  No LAD.  No thyromegaly.  Extraocular


movement intact.  Pupils are reactive to light and accommodation.


LUNGS:  He has bilateral wheezing and rhonchi.


CARDIAC:  Regular rate and rhythm.  S1, S2, no murmur.  No rub.


ABDOMEN:  Soft, nontender, and nondistended.


EXTREMITIES:  No edema.  No clubbing.  No cyanosis.











JOSE MOROCHO Mar 28, 2017 19:10

## 2017-03-29 NOTE — CARDIOLOGY PROGRESS NOTE
Assessment/Plan


Assessment/Plan


1. Hypotension, resolved, probably medication induced, responded well to 

hydration, creat down from 1.2 to 0.9.


2. Acute exacerbation of COPD


3. PAD, on ASA


4. DM, continue ASA and atorvastatin.


5. Hx of TIA, start ASA and statins.





Subjective


Subjective


Had episodes of diarrhea


SOB with minimal activities.





Objective





Last 24 Hour Vital Signs








  Date Time  Temp Pulse Resp B/P Pulse Ox O2 Delivery O2 Flow Rate FiO2


 


3/29/17 23:01  67 18  100 Room Air  21


 


3/29/17 22:52  64 20  97 Room Air  21


 


3/29/17 22:50  67 20  97 Room Air  21


 


3/29/17 19:45  76 18  100 Room Air  21


 


3/29/17 19:34  70 18  97 Room Air  21


 


3/29/17 19:00 97.9 81 20 139/90 98 Room Air  


 


3/29/17 16:00 98.1 76 20 150/90 98 Room Air  


 


3/29/17 15:27  72 18  100 Room Air  21


 


3/29/17 15:17  68 16  98 Room Air  21


 


3/29/17 12:15 97.9 80 21 114/70 96 Room Air  


 


3/29/17 10:35  86 18  100 Room Air  21


 


3/29/17 10:25  75 18  96 Room Air  21


 


3/29/17 08:46  83  109/62    


 


3/29/17 08:07 97.1 83 21 109/62 97 Room Air  


 


3/29/17 07:51  82 20  100 Room Air  21


 


3/29/17 07:43  81 18  95 Room Air  21


 


3/29/17 04:00 97.3 74 20 128/76 99 Room Air  


 


3/29/17 03:21  66 20  100 Room Air  21


 


3/29/17 03:20  67 20  97 Room Air  21


 


3/29/17 00:00 97.5 79 18 141/96 98 Room Air  


 


3/28/17 23:37  62 20  100 Room Air  21


 


3/28/17 23:36  63 20  97 Room Air  21

















Intake and Output  


 


 3/28/17 3/29/17





 19:00 07:00


 


Intake Total 590.0 ml 535.0 ml


 


Balance 590.0 ml 535.0 ml


 


  


 


Intake Oral 480 ml 480 ml


 


IV Total 110.0 ml 55.0 ml


 


# Voids 2 7


 


# Bowel Movements 2 








2D Echo:  Echo form 2015 shows normal LV systolic function withLVEF 55%, RVSP 9 

mMHg.


Objective


HEAD AND NECK:   No thyromegaly.  Extraocular


movement intact.  Pupils are reactive to light and accommodation.


NECK: No JVP, no carotid bruit, upstroke 2+ B/L


LUNGS:  He has bilateral wheezing and rhonchi, no breath sounds 


CARDIAC:  Regular rate and rhythm.  Normal S1, S2, no murmur, gallops or rubs.


ABDOMEN:  Soft, nontender, and nondistended, + BS


EXTREMITIES:  No edema, clubbing or cyanosis.











JOE JAMES Mar 29, 2017 23:31

## 2017-03-29 NOTE — CONSULTATION
DATE OF CONSULTATION:  03/28/2017



HISTORY OF PRESENT ILLNESS:  This is a 61-year-old black male patient

with history of depression, who has been admitted for COPD exacerbation.

During the evaluation, the patient is presenting with anxiety and

insomnia.  He stated that he constantly went over the issues that he has

been dealing throughout the day.  He denied any current depressive

symptoms.  No manic or psychotic symptoms.



PAST PSYCHIATRIC HISTORY:  Diagnosed with depression and has been

treated with antidepressants in the past.  No history of psychiatric

hospitalization.



PAST MEDICAL HISTORY:  Transient ischemic attack, hypertension,

diabetes, peripheral vascular disease, osteomyelitis and cervical and

lumbar radiculopathy.



ALLERGIES:  No known drug allergies.



MEDICATIONS:  At home include Restoril 15 mg, Ativan 0.5 mg daily,

morphine, promethazine, albuterol, _____, Flomax, metoprolol, Lasix and

amantadine.



SUBSTANCE ABUSE HISTORY:  No history of illicit drug use or

alcohol.



MENTAL STATUS EXAMINATION:  The patient is alert and oriented x4,

pleasant and calm.  Mood is neutral to anxious.  Affect is constricted.

Congruent with mood.  Thought process is circumstantial.  Thought content,

no suicidal or homicidal ideations.  No delusions.  No auditory or visual

hallucinations.  Insight and judgment is fair.



ASSESSMENT:

1. Anxiety disorder.

2. Major depressive disorder.



PLAN:

1. The patient will be continued on Restoril and Ativan.

2. We will start the patient on low-dose of selective serotonin reuptake

inhibitors.

3. We will continue follow and readjust the medications.









  ______________________________________________

  Sam Cannon M.D.





DR:  SMITH

D:  03/28/2017 18:49

T:  03/29/2017 03:54

JOB#:  [id number]

CC:

## 2017-03-29 NOTE — PULMONOLOGY PROGRESS NOTE
Subjective


Allergies:  


Coded Allergies:  


     SHARK LIVER OIL (Unverified  Allergy, Unknown, 8/6/16)


Uncoded Allergies:  


     SHARK (Allergy, Mild, RASH, 4/11/16)





Objective





Last 24 Hour Vital Signs








  Date Time  Temp Pulse Resp B/P Pulse Ox O2 Delivery O2 Flow Rate FiO2


 


3/29/17 19:45  76 18  100 Room Air  21


 


3/29/17 19:34  70 18  97 Room Air  21


 


3/29/17 19:00 97.9 81 20 139/90 98 Room Air  


 


3/29/17 16:00 98.1 76 20 150/90 98 Room Air  


 


3/29/17 15:27  72 18  100 Room Air  21


 


3/29/17 15:17  68 16  98 Room Air  21


 


3/29/17 12:15 97.9 80 21 114/70 96 Room Air  


 


3/29/17 10:35  86 18  100 Room Air  21


 


3/29/17 10:25  75 18  96 Room Air  21


 


3/29/17 08:46  83  109/62    


 


3/29/17 08:07 97.1 83 21 109/62 97 Room Air  


 


3/29/17 07:51  82 20  100 Room Air  21


 


3/29/17 07:43  81 18  95 Room Air  21


 


3/29/17 04:00 97.3 74 20 128/76 99 Room Air  


 


3/29/17 03:21  66 20  100 Room Air  21


 


3/29/17 03:20  67 20  97 Room Air  21


 


3/29/17 00:00 97.5 79 18 141/96 98 Room Air  


 


3/28/17 23:37  62 20  100 Room Air  21


 


3/28/17 23:36  63 20  97 Room Air  21

















Intake and Output  


 


 3/28/17 3/29/17





 19:00 07:00


 


Intake Total 590.0 ml 535.0 ml


 


Balance 590.0 ml 535.0 ml


 


  


 


Intake Oral 480 ml 480 ml


 


IV Total 110.0 ml 55.0 ml


 


# Voids 2 7


 


# Bowel Movements 2 











Current Medications








 Medications


  (Trade)  Dose


 Ordered  Sig/Amina


 Route


 PRN Reason  Start Time


 Stop Time Status Last Admin


Dose Admin


 


 Albuterol/


 Ipratropium


  (DuoNeb


 0.5-3(2.5)mg/3ml)  3 ml  Q4HRT


 HHN


   3/28/17 11:00


 4/2/17 10:59  3/29/17 19:34


 


 


 Amantadine HCl


  (Symmetrel)  100 mg  DAILY


 ORAL


   3/25/17 09:00


 4/24/17 08:59  3/29/17 08:45


 


 


 Aspirin


  (Ecotrin)  81 mg  DAILY


 ORAL


   3/27/17 09:00


 4/26/17 08:59  3/29/17 08:45


 


 


 Atorvastatin


 Calcium


  (Lipitor)  40 mg  BEDTIME


 ORAL


   3/27/17 21:00


 4/26/17 20:59  3/29/17 21:30


 


 


 Dextrose


  (Dextrose 50%)    STAT  PRN


 IV


 Hypoglycemia  3/24/17 21:00


 4/23/17 20:59   


 


 


 Heparin Sodium


  (Porcine)


  (Heparin 5000


 units/ml)  5,000 units  EVERY 12  HOURS


 SUBQ


   3/24/17 22:00


 4/23/17 21:59  3/29/17 21:32


 


 


 Insulin Aspart


  (NovoLOG)    BEFORE MEALS AND  HS


 SUBQ


   3/24/17 23:00


 4/23/17 22:59  3/29/17 21:34


 


 


 Lorazepam


  (Ativan 2mg/ml


 1ml)  0.5 mg  Q4H  PRN


 IV


 For Anxiety  3/24/17 21:00


 3/31/17 20:59   


 


 


 Methylprednisolone


 Sodium Succinate


  (Solu-MEDROL)  60 mg  DAILY


 IV


   3/29/17 09:00


 4/28/17 08:59  3/29/17 08:46


 


 


 Metoprolol


 Tartrate


  (Lopressor)  25 mg  DAILY


 ORAL


   3/25/17 09:00


 4/24/17 08:59  3/29/17 08:46


 


 


 Morphine Sulfate


  (Morphine


 Sulfate)  2 mg  Q4H  PRN


 IVP


 severe pain 7-10  3/24/17 21:00


 3/31/17 20:59  3/28/17 15:50


 


 


 Nitroglycerin


  (Ntg)  0.4 mg  Q5M X 3 DOSES PRN


 SL


 Prn Chest Pain  3/24/17 21:00


 4/23/17 20:59   


 


 


 Ondansetron HCl


  (Zofran)  4 mg  Q6H  PRN


 IVP


 Nausea & Vomiting  3/24/17 21:00


 4/23/17 20:59   


 


 


 Piperacillin Sod/


 Tazobactam Sod/


 Dextrose


  (Zosyn/D5W)  110 ml @ 


 27.5 mls/hr  EVERY 8  HOURS


 IVPB


   3/24/17 23:00


 3/31/17 22:59  3/29/17 21:30


 


 


 Promethazine HCl/


 Codeine


  (Phenergan with


 Codeine)  5 ml  Q6H  PRN


 ORAL


 cough  3/24/17 21:00


 4/23/17 20:59   


 


 


 Tamsulosin HCl


  (Flomax)  0.8 mg  BEDTIME


 ORAL


   3/24/17 22:00


 4/23/17 21:59  3/29/17 21:31


 


 


 Temazepam 15 mg  15 mg  HSPRN  PRN


 ORAL


 Insomnia  3/24/17 21:00


 3/31/17 20:59  3/27/17 00:12


 


 


 Theophylline


  (Shreyas-Dur)  200 mg  Q12HR


 ORAL


   3/27/17 21:00


 4/26/17 20:59  3/29/17 21:30


 

















BARB SUE Mar 29, 2017 22:34

## 2017-03-30 NOTE — NEPHROLOGY PROGRESS NOTE
Assessment/Plan


Assessment


1. Acute chronic obstructive pulmonary disease exacerbation.


2. Urinary tract infection.


3. Acute coronary syndrome, mild chest pain.


4. Hypertension, well controlled.


5. Diabetes.


6. History of severe osteoarthritis.


Plan


plan to 


continue iv antibiotic


continue steroid 


continue lasix 


breathing treatment 


monitoring electrolyte





Subjective


Constitutional:  Reports: no symptoms


HEENT:  Reports: no symptoms


Genitourinary:  Reports: no symptoms


Neurologic/Psychiatric:  Reports: no symptoms


Subjective


alert and wake 


feeling better 


still has cough and sob but sig improved





Objective


Objective





Last 24 Hour Vital Signs








  Date Time  Temp Pulse Resp B/P Pulse Ox O2 Delivery O2 Flow Rate FiO2


 


3/30/17 08:47  98  99/60    


 


3/30/17 08:00 97.3 98 21 99/60 97 Room Air  


 


3/30/17 07:11  92 18  98 Room Air  


 


3/30/17 07:01  90 18  95 Room Air  


 


3/30/17 04:00 97.5 77 21 138/88 98 Room Air  


 


3/30/17 03:26      Room Air  


 


3/30/17 03:26      Room Air  


 


3/30/17 00:32 98.1 65 20 139/93 99 Nasal Cannula  


 


3/29/17 23:01  67 18  100 Room Air  21


 


3/29/17 22:52  64 20  97 Room Air  21


 


3/29/17 22:50  67 20  97 Room Air  21


 


3/29/17 19:45  76 18  100 Room Air  21


 


3/29/17 19:34  70 18  97 Room Air  21


 


3/29/17 19:00 97.9 81 20 139/90 98 Room Air  


 


3/29/17 16:00 98.1 76 20 150/90 98 Room Air  


 


3/29/17 15:27  72 18  100 Room Air  21


 


3/29/17 15:17  68 16  98 Room Air  21


 


3/29/17 12:15 97.9 80 21 114/70 96 Room Air  


 


3/29/17 10:35  86 18  100 Room Air  21


 


3/29/17 10:25  75 18  96 Room Air  21

















Intake and Output  


 


 3/29/17 3/30/17





 19:00 07:00


 


Intake Total 857.5 ml 562.5 ml


 


Output Total  700 ml


 


Balance 857.5 ml -137.5 ml


 


  


 


Intake Oral 720 ml 480 ml


 


IV Total 137.5 ml 82.5 ml


 


Output Urine Total  700 ml


 


# Voids 2 4


 


# Bowel Movements  1








Height (Feet):  5


Height (Inches):  11.00


Weight (Pounds):  170


Objective


HEAD AND NECK:  No JVP.  No LAD.  No thyromegaly.  Extraocular


movement intact.  Pupils are reactive to light and accommodation.


LUNGS:  He has bilateral wheezing and rhonchi.


CARDIAC:  Regular rate and rhythm.  S1, S2, no murmur.  No rub.


ABDOMEN:  Soft, nontender, and nondistended.


EXTREMITIES:  No edema.  No clubbing.  No cyanosis.











JOSE MOROCHO Mar 30, 2017 10:13

## 2017-03-30 NOTE — CARDIOLOGY PROGRESS NOTE
Assessment/Plan


Assessment/Plan


1. Hypotension, resolved, probably medication induced, responded well to 

hydration, creat down from 1.2 to 0.9.


2. PAD, on ASA


3. DM, continue ASA and atorvastatin.


4. Hx of TIA, start ASA and statins.





Subjective


Subjective


No cardiac events





Objective





Last 24 Hour Vital Signs








  Date Time  Temp Pulse Resp B/P Pulse Ox O2 Delivery O2 Flow Rate FiO2


 


3/30/17 19:32  82 20  100 Room Air  21


 


3/30/17 19:20  81 18  95 Room Air  21


 


3/30/17 16:00 97.5 79 20 139/81 97 Room Air  


 


3/30/17 15:02  92 18  98 Room Air  


 


3/30/17 14:52  90 18  95 Room Air  


 


3/30/17 12:42 98.2 70 19 116/59 99 Room Air  


 


3/30/17 10:57  92 18  98 Room Air  


 


3/30/17 10:47  90 18  95 Room Air  


 


3/30/17 08:47  98  99/60    


 


3/30/17 08:00 97.3 98 21 99/60 97 Room Air  


 


3/30/17 07:11  92 18  98 Room Air  


 


3/30/17 07:01  90 18  95 Room Air  


 


3/30/17 04:00 97.5 77 21 138/88 98 Room Air  


 


3/30/17 03:26      Room Air  


 


3/30/17 03:26      Room Air  


 


3/30/17 00:32 98.1 65 20 139/93 99 Nasal Cannula  


 


3/29/17 23:01  67 18  100 Room Air  21


 


3/29/17 22:52  64 20  97 Room Air  21


 


3/29/17 22:50  67 20  97 Room Air  21

















Intake and Output  


 


 3/29/17 3/30/17





 19:00 07:00


 


Intake Total 857.5 ml 562.5 ml


 


Output Total  700 ml


 


Balance 857.5 ml -137.5 ml


 


  


 


Intake Oral 720 ml 480 ml


 


IV Total 137.5 ml 82.5 ml


 


Output Urine Total  700 ml


 


# Voids 2 4


 


# Bowel Movements  1








2D Echo:   Echo form 2015 shows normal LV systolic function withLVEF 55%, RVSP 

9 mMHg


Objective


HEAD AND NECK:   No thyromegaly.  Extraocular


movement intact.  Pupils are reactive to light and accommodation.


NECK: No JVP, no carotid bruit, upstroke 2+ B/L


LUNGS:  He has bilateral wheezing and rhonchi, no breath sounds 


CARDIAC:  Regular rate and rhythm.  Normal S1, S2, no murmur, gallops or rubs.


ABDOMEN:  Soft, nontender, and nondistended, + BS


EXTREMITIES:  No edema, clubbing or cyanosis.











JOE JAMES Mar 30, 2017 21:33

## 2017-03-30 NOTE — PULMONOLOGY PROGRESS NOTE
Subjective


Allergies:  


Coded Allergies:  


     SHARK LIVER OIL (Unverified  Allergy, Unknown, 8/6/16)


Uncoded Allergies:  


     SHARK (Allergy, Mild, RASH, 4/11/16)





Objective





Last 24 Hour Vital Signs








  Date Time  Temp Pulse Resp B/P Pulse Ox O2 Delivery O2 Flow Rate FiO2


 


3/30/17 19:32  82 20  100 Room Air  21


 


3/30/17 19:20  81 18  95 Room Air  21


 


3/30/17 16:00 97.5 79 20 139/81 97 Room Air  


 


3/30/17 15:02  92 18  98 Room Air  


 


3/30/17 14:52  90 18  95 Room Air  


 


3/30/17 12:42 98.2 70 19 116/59 99 Room Air  


 


3/30/17 10:57  92 18  98 Room Air  


 


3/30/17 10:47  90 18  95 Room Air  


 


3/30/17 08:47  98  99/60    


 


3/30/17 08:00 97.3 98 21 99/60 97 Room Air  


 


3/30/17 07:11  92 18  98 Room Air  


 


3/30/17 07:01  90 18  95 Room Air  


 


3/30/17 04:00 97.5 77 21 138/88 98 Room Air  


 


3/30/17 03:26      Room Air  


 


3/30/17 03:26      Room Air  


 


3/30/17 00:32 98.1 65 20 139/93 99 Nasal Cannula  


 


3/29/17 23:01  67 18  100 Room Air  21


 


3/29/17 22:52  64 20  97 Room Air  21


 


3/29/17 22:50  67 20  97 Room Air  21

















Intake and Output  


 


 3/29/17 3/30/17





 19:00 07:00


 


Intake Total 857.5 ml 562.5 ml


 


Output Total  700 ml


 


Balance 857.5 ml -137.5 ml


 


  


 


Intake Oral 720 ml 480 ml


 


IV Total 137.5 ml 82.5 ml


 


Output Urine Total  700 ml


 


# Voids 2 4


 


# Bowel Movements  1











Current Medications








 Medications


  (Trade)  Dose


 Ordered  Sig/Amina


 Route


 PRN Reason  Start Time


 Stop Time Status Last Admin


Dose Admin


 


 Albuterol/


 Ipratropium


  (DuoNeb


 0.5-3(2.5)mg/3ml)  3 ml  Q4HRT


 HHN


   3/28/17 11:00


 4/2/17 10:59  3/30/17 19:20


 


 


 Amantadine HCl


  (Symmetrel)  100 mg  DAILY


 ORAL


   3/25/17 09:00


 4/24/17 08:59  3/30/17 08:50


 


 


 Aspirin


  (Ecotrin)  81 mg  DAILY


 ORAL


   3/27/17 09:00


 4/26/17 08:59  3/30/17 08:50


 


 


 Atorvastatin


 Calcium


  (Lipitor)  40 mg  BEDTIME


 ORAL


   3/27/17 21:00


 4/26/17 20:59  3/30/17 21:01


 


 


 Dextrose


  (Dextrose 50%)    STAT  PRN


 IV


 Hypoglycemia  3/24/17 21:00


 4/23/17 20:59   


 


 


 Escitalopram


 Oxalate


  (Lexapro)  10 mg  DAILY


 ORAL


   3/31/17 09:00


 4/30/17 08:59   


 


 


 Heparin Sodium


  (Porcine)


  (Heparin 5000


 units/ml)  5,000 units  EVERY 12  HOURS


 SUBQ


   3/24/17 22:00


 4/23/17 21:59  3/30/17 21:07


 


 


 Insulin Aspart


  (NovoLOG)    BEFORE MEALS AND  HS


 SUBQ


   3/24/17 23:00


 4/23/17 22:59  3/30/17 21:07


 


 


 Lorazepam


  (Ativan 2mg/ml


 1ml)  0.5 mg  Q4H  PRN


 IV


 For Anxiety  3/24/17 21:00


 3/31/17 20:59   


 


 


 Methylprednisolone


 Sodium Succinate


  (Solu-MEDROL)  50 mg  DAILY


 IV


   3/31/17 09:00


 4/30/17 08:59   


 


 


 Metoprolol


 Tartrate


  (Lopressor)  25 mg  DAILY


 ORAL


   3/25/17 09:00


 4/24/17 08:59  3/29/17 08:46


 


 


 Morphine Sulfate


  (Morphine


 Sulfate)  2 mg  Q4H  PRN


 IVP


 severe pain 7-10  3/24/17 21:00


 3/31/17 20:59  3/28/17 15:50


 


 


 Nitroglycerin


  (Ntg)  0.4 mg  Q5M X 3 DOSES PRN


 SL


 Prn Chest Pain  3/24/17 21:00


 4/23/17 20:59   


 


 


 Ondansetron HCl


  (Zofran)  4 mg  Q6H  PRN


 IVP


 Nausea & Vomiting  3/24/17 21:00


 4/23/17 20:59   


 


 


 Piperacillin Sod/


 Tazobactam Sod/


 Dextrose


  (Zosyn/D5W)  110 ml @ 


 27.5 mls/hr  EVERY 8  HOURS


 IVPB


   3/24/17 23:00


 3/31/17 22:59  3/30/17 21:10


 


 


 Promethazine HCl/


 Codeine


  (Phenergan with


 Codeine)  5 ml  Q6H  PRN


 ORAL


 cough  3/24/17 21:00


 4/23/17 20:59   


 


 


 Tamsulosin HCl


  (Flomax)  0.8 mg  BEDTIME


 ORAL


   3/24/17 22:00


 4/23/17 21:59  3/30/17 21:01


 


 


 Temazepam 15 mg  15 mg  HSPRN  PRN


 ORAL


 Insomnia  3/24/17 21:00


 3/31/17 20:59  3/30/17 21:05


 


 


 Theophylline


  (Shreyas-Dur)  200 mg  Q12HR


 ORAL


   3/27/17 21:00


 4/26/17 20:59  3/30/17 21:01


 

















BARB SUE Mar 30, 2017 22:10

## 2017-03-30 NOTE — PROGRESS NOTE
SUBJECTIVE:  The patient in no acute distress.  Calm and cooperative.

No behavior issues.  He is still suffering from anxiety, however,

symptoms could be related to his COPD.  The patient was agitated in

regards to anxiety and COPD relation.



MENTAL STATUS EXAMINATION:  The patient is alert and oriented x3.

Calm and pleasant.  Mood is slightly anxious.  Affect is full range.

Congruent mood.  Thought process is concrete.  Thought content, no

suicidal or homicidal ideation.



ASSESSMENT:

1. Anxiety disorder.

2. Depression by history.



PLAN:  The patient will be continued current medication.  Provide the

patient with supportive therapy and reality orientation.









  ______________________________________________

  Sam Cannon M.D.





DR:  Bernadine

D:  03/30/2017 14:26

T:  03/30/2017 21:27

JOB#:  [id number]

CC:

## 2017-03-31 NOTE — GENERAL PROGRESS NOTE
Progress Note


Progress Note


[id number] pt seen and examined full note dictated











JOSE MOROCHO Mar 31, 2017 13:06

## 2017-03-31 NOTE — PULMONOLOGY PROGRESS NOTE
Assessment/Plan


Assessment/Plan


ASSESSMENT


COPD exacerbation 


UTI 


Hypotension- resolved


hx of hypertension


DM 


PAD 


Hx of TIA 


anxiety 


major depression   





PLAN OF CARE  


[facility] floor 


O2 HHN prn 


sputum cx + Klebsiella, Candida 


urine cx + Klebsiella 


abx 


IV steroids and taper 


CXR c/w COPD


antitussive prn  


on ASA and statin 


cardio follows 


hypotension resolved, 


on low dose BB,  would recommend to change to another antihypertensive , not 

recommended BB in patient with COPD, especially during exacerbation


DVT prophylaxis  


BS management with SS of insulin, HgA1c pending for this am


Bowel regimen  


psych follows 


psych meds as per psychiatrist management  


stable for dc from pulmonary standpoint: Medrol dose pack, inhalers,  s/p Rx 

with antibiotic x 7 days-completed today  





case discussed and evaluated by supervising physician





Subjective


Allergies:  


Coded Allergies:  


     SHARK LIVER OIL (Unverified  Allergy, Unknown, 8/6/16)


Uncoded Allergies:  


     SHARK (Allergy, Mild, RASH, 4/11/16)


Subjective


afebrile, no leukocytosis 


on RA, stable pulse oximetry  


no signs of respiratory distress





Objective





Last 24 Hour Vital Signs








  Date Time  Temp Pulse Resp B/P Pulse Ox O2 Delivery O2 Flow Rate FiO2


 


3/31/17 04:00 97.2 74 18 125/83 97 Room Air  


 


3/31/17 03:33      Room Air  


 


3/31/17 03:32      Room Air  


 


3/31/17 00:00 96.8 78 18 136/87 99 Room Air  


 


3/30/17 23:25      Room Air  


 


3/30/17 23:25      Room Air  


 


3/30/17 19:32  82 20  100 Room Air  21


 


3/30/17 19:20  81 18  95 Room Air  21


 


3/30/17 19:00 97.9 97 20 148/79 98 Room Air  


 


3/30/17 16:00 97.5 79 20 139/81 97 Room Air  


 


3/30/17 15:02  92 18  98 Room Air  


 


3/30/17 14:52  90 18  95 Room Air  


 


3/30/17 12:42 98.2 70 19 116/59 99 Room Air  


 


3/30/17 10:57  92 18  98 Room Air  


 


3/30/17 10:47  90 18  95 Room Air  


 


3/30/17 08:47  98  99/60    


 


3/30/17 08:00 97.3 98 21 99/60 97 Room Air  

















Intake and Output  


 


 3/30/17 3/31/17





 19:00 07:00


 


Intake Total 665.0 ml 470.0 ml


 


Output Total  950 ml


 


Balance 665.0 ml -480.0 ml


 


  


 


Intake Oral 610 ml 360 ml


 


IV Total 55.0 ml 110.0 ml


 


Output Urine Total  950 ml


 


# Voids  8








General Appearance:  WD/WN, no acute distress


HEENT:  normocephalic, atraumatic, anicteric, mucous membranes moist, PERRL


Respiratory/Chest:  no respiratory distress, no accessory muscle use, decreased 

breath sounds


Cardiovascular:  normal peripheral pulses, normal rate, regular rhythm, no JVD


Abdomen:  normal bowel sounds, soft, non tender, non distended


Genitourinary:  normal external genitalia


Extremities:  no edema


Neurologic/Psychiatric:  no motor/sensory deficits, alert, oriented x 3, 

responsive


Musculoskeletal:  normal muscle bulk


Laboratory Tests


3/31/17 05:50: 


White Blood Count 7.0, Red Blood Count 4.84, Hemoglobin 15.2, Hematocrit 45.4, 

Mean Corpuscular Volume 94, Mean Corpuscular Hemoglobin 31.4H, Mean Corpuscular 

Hemoglobin Concent 33.5, Red Cell Distribution Width 12.5, Platelet Count 259, 

Mean Platelet Volume 6.9, Neutrophils (%) (Auto) 64.6, Lymphocytes (%) (Auto) 

23.6, Monocytes (%) (Auto) 8.8, Eosinophils (%) (Auto) 2.5, Basophils (%) (Auto

) 0.6, Sodium Level 145, Potassium Level 3.6, Chloride Level 102, Carbon 

Dioxide Level 31H, Anion Gap 12, Blood Urea Nitrogen 15, Creatinine 1.1, 

Estimat Glomerular Filtration Rate > 60, Glucose Level 142H, Hemoglobin A1c [

Pending], Calcium Level 8.7





Current Medications








 Medications


  (Trade)  Dose


 Ordered  Sig/Amina


 Route


 PRN Reason  Start Time


 Stop Time Status Last Admin


Dose Admin


 


 Albuterol/


 Ipratropium


  (DuoNeb


 0.5-3(2.5)mg/3ml)  3 ml  Q4HRT


 HHN


   3/28/17 11:00


 4/2/17 10:59  3/30/17 19:20


 


 


 Amantadine HCl


  (Symmetrel)  100 mg  DAILY


 ORAL


   3/25/17 09:00


 4/24/17 08:59  3/30/17 08:50


 


 


 Aspirin


  (Ecotrin)  81 mg  DAILY


 ORAL


   3/27/17 09:00


 4/26/17 08:59  3/30/17 08:50


 


 


 Atorvastatin


 Calcium


  (Lipitor)  40 mg  BEDTIME


 ORAL


   3/27/17 21:00


 4/26/17 20:59  3/30/17 21:01


 


 


 Dextrose


  (Dextrose 50%)    STAT  PRN


 IV


 Hypoglycemia  3/24/17 21:00


 4/23/17 20:59   


 


 


 Escitalopram


 Oxalate


  (Lexapro)  10 mg  DAILY


 ORAL


   3/31/17 09:00


 4/30/17 08:59   


 


 


 Heparin Sodium


  (Porcine)


  (Heparin 5000


 units/ml)  5,000 units  EVERY 12  HOURS


 SUBQ


   3/24/17 22:00


 4/23/17 21:59  3/30/17 21:07


 


 


 Insulin Aspart


  (NovoLOG)    BEFORE MEALS AND  HS


 SUBQ


   3/24/17 23:00


 4/23/17 22:59  3/31/17 06:00


 


 


 Lorazepam


  (Ativan 2mg/ml


 1ml)  0.5 mg  Q4H  PRN


 IV


 For Anxiety  3/24/17 21:00


 3/31/17 20:59   


 


 


 Methylprednisolone


 Sodium Succinate


  (Solu-MEDROL)  50 mg  DAILY


 IV


   3/31/17 09:00


 4/30/17 08:59   


 


 


 Metoprolol


 Tartrate


  (Lopressor)  25 mg  DAILY


 ORAL


   3/25/17 09:00


 4/24/17 08:59  3/29/17 08:46


 


 


 Morphine Sulfate


  (Morphine


 Sulfate)  2 mg  Q4H  PRN


 IVP


 severe pain 7-10  3/24/17 21:00


 3/31/17 20:59  3/28/17 15:50


 


 


 Nitroglycerin


  (Ntg)  0.4 mg  Q5M X 3 DOSES PRN


 SL


 Prn Chest Pain  3/24/17 21:00


 4/23/17 20:59   


 


 


 Ondansetron HCl


  (Zofran)  4 mg  Q6H  PRN


 IVP


 Nausea & Vomiting  3/24/17 21:00


 4/23/17 20:59   


 


 


 Piperacillin Sod/


 Tazobactam Sod/


 Dextrose


  (Zosyn/D5W)  110 ml @ 


 27.5 mls/hr  EVERY 8  HOURS


 IVPB


   3/24/17 23:00


 3/31/17 22:59  3/31/17 05:54


 


 


 Promethazine HCl/


 Codeine


  (Phenergan with


 Codeine)  5 ml  Q6H  PRN


 ORAL


 cough  3/24/17 21:00


 4/23/17 20:59   


 


 


 Tamsulosin HCl


  (Flomax)  0.8 mg  BEDTIME


 ORAL


   3/24/17 22:00


 4/23/17 21:59  3/30/17 21:01


 


 


 Temazepam 15 mg  15 mg  HSPRN  PRN


 ORAL


 Insomnia  3/24/17 21:00


 3/31/17 20:59  3/30/17 21:05


 


 


 Theophylline


  (Shreyas-Dur)  200 mg  Q12HR


 ORAL


   3/27/17 21:00


 4/26/17 20:59  3/30/17 21:01


 

















[facility] ([facility])Jen NP Mar 31, 2017 07:33

## 2017-04-01 NOTE — DISCHARGE SUMMARY
DATE OF ADMISSION:  03/24/2017



DATE OF DISCHARGE:  03/31/2017



ADMITTING DIAGNOSES:

1. Chronic obstructive pulmonary disease exacerbation.

2. Urinary tract infection.

3. Hypotension.

4. History of hypertension.

5. History of diabetes.

6. History of transient ischemic attack.

7. History of anxiety.

8. History of depression.



COURSE OF HOSPITAL ADMISSION:  The patient is a pleasant 

American male with past medical history significant for above who came to

the emergency room complaining of increasing shortness of breath,

orthopnea, chest pain, and nonproductive cough.  He was found to have COPD

exacerbation.  He was started on steroids.  He was started on IV

antibiotics.  The patient was seen by Cardiology and Pulmonary

consultation.  The patient's condition and shortness of breath had

improved.  The patient will be discharged home.  We will have a follow up

with primary physician, Dr. Bonifacio Hernandez.



CONDITION UPON DISCHARGE:  Stable.



PHYSICAL EXAMINATION:

VITAL SIGNS:  Upon discharge, the patient has temperature of 97

degrees, blood pressure 121/77, and pulse rate of 100.

HEAD AND NECK:  No JVP.  No LAD.  No thyromegaly.  Extraocular

movement intact.  Pupils are reactive to light and accommodation.

LUNGS:  Clear to auscultation.

CARDIAC:  Regular rate and rhythm.  S1-S2.  No murmur.  No rub.

ABDOMEN:  Soft, nontender, and nondistended.

EXTREMITIES:  No edema.  No clubbing.  No cyanosis.



LABORATORY VALUES:  The patient has WBC count of 7, hemoglobin 15,

hematocrit 45, and platelet count of 259,000.  Chemistry revealed sodium

145, potassium 3.6, 102 chloride, 15 bicarbonate,  creatinine 1.1, and

glucose of 142.  A1c of 7.1.



DISCHARGE DIAGNOSES:

1. Chronic obstructive pulmonary disease exacerbation.

2. Urinary tract infection.

3. Hypotension.

4. History of hypertension.

5. History of diabetes.

6. History of transient ischemic attack.

7. History of anxiety.

8. History of depression.



PROGNOSIS:  Poor.



DIET:  Going to be 2 g sodium, 1800 ADA diet.









  ______________________________________________

  Nisha Smith M.D.





DR:  Daron

D:  03/31/2017 13:08

T:  04/01/2017 01:21

JOB#:  [id number]

CC:

## 2017-11-16 NOTE — DIAGNOSTIC IMAGING REPORT
--------------- APPROVED REPORT --------------





CPT Code: 59383



Symptoms

Comments: Right leg pain



Risk Factors

Diabetes





RIGHT LEG: Common femoral artery waveform analysis is within normal limits at rest. Color 

flow duplex sonography reveals minimal calcification throughout the superficial femoral 

and popliteal arteries. There is no evidence of stenosis or occlusion within these 

segments.  The tibioperoneal trunks were patent. The posterior tibial, anterior tibial 

and dorsalis pedis arteries are minimally calcified. Ankle-brachial indices and, Doppler 

tibial artery waveform analysis are within normal limits, at rest.

## 2018-01-15 NOTE — DIAGNOSTIC IMAGING REPORT
Indication: Reason For Exam: BK PAIN

 

Technique: MRI of the lumbar spine was obtained in a multiplanar multisequence

acquisition. Lung sequences were obtained: 3 plane localizer, sagittal T1 FSE, T2

FRFSE, and STIR; axial T2 FRFSE, T1. 

 

Comparison: 12/18/2015

 

Findings: There are 5 lumbar-type nonrib-bearing vertebral bodies, assuming 12 paired

ribs. There is no abnormal lumbar curvature. There is mild straightening of the

lumbar lordosis. There are degenerative marrow signal changes within the endplates at

L5-S1. Otherwise marrow signal is within normal limits. The tip of the conus is noted

to terminate at the level of L1. Distal spinal cord demonstrates normal morphology

and normal, homogeneous signal intensity.

 

At T12-L1: No significant disc protrusion or extrusion.There is no evidence of

significant central canal stenosis or foraminal narrowing. 

 

At the L1-L2: No significant disc protrusion or extrusion. There is mild hypertrophy

of ligamentum flavum. There is no evidence of significant central canal stenosis or

foraminal narrowing. 

 

At L2-L3: A tiny disc bulge minimally indents the thecal sac. There is mild

hypertrophy of the ligamentum flavum at this level. There is no significant central

canal stenosis or foraminal narrowing.

 

At L3-L4: Hypertrophy of the ligamentum flavum and tiny circumferential bulge results

in very mild central canal stenosis. No foraminal narrowing.

 

At L4-L5: A broad-based disc bulge and hypertrophy of the ligamentum flavum result in

moderate central canal stenosis.  There is mild bilateral foraminal narrowing at this

level, right greater than left. There is clumping of nerve roots and some facet

hypertrophy. This is unchanged.

 

At L5-S1: Hypertrophy of the ligamentum flavum and broad-based posterior disc bulge

noted. Small focal foraminal protrusion of the disc on the left again noted. There is

moderate to severe central canal stenosis at this level as well as severe bilateral

foraminal narrowing. This is overall without change.

 

The thoracic aorta is normal in caliber. There is a well-circumscribed 1.2 cm T2

hyperintense lesion in the left kidney compatible with a simple renal cyst.

 

 

Impression: 

Degenerative change of the lumbar spine most pronounced at L4-L5 abd L5-S1 as

detailed above. Findings are overall without significant interval change compared to

the prior exam of 12/18/2015.

## 2018-01-16 NOTE — DIAGNOSTIC IMAGING REPORT
Indication: Right hip pain

 

Technique: Right hip imaging utilizing multiplanar T1 fast spin-echo, proton and T2

fast spin-echo with fat saturation, and STIR.

 

Comparison: None

 

Findings: There is diffuse generalized labral degeneration involving both hips. The

normal configuration of the acetabular labrum is not demonstrated. Associated

subchondral irregularity, moderate marginal osteophyte formation are noted in

conjunction with this. Bone marrow signal is essentially normal. There is some

retention of red marrow or other nonspecific areas of more cellular appearing marrow

within portions of the femoral head and neck. This does not have the configuration of

the acute injury or AVN. There is no joint effusion. Degenerative changes of both

sacroiliac joints also demonstrated in a similar fashion. The visualized part of the

lower lumbar spine shows moderate spondylosis with desiccation and narrowing of the

intervertebral discs, hypertrophied endplates and facets.

 

IMPRESSION:

 

Moderate arthrosis involving both hips, sacroiliac joints and facets. Degenerative

disc disease also demonstrated.

 

No evidence of AVN, acute injury or other acute pathology.

## 2018-03-02 NOTE — DIAGNOSTIC IMAGING REPORT
Indication: 62-year-old male right-sided leg weakness and dizziness

 

Technique: The head was imaged in a 1.5 Celia magnet. Sequences obtained include

sagittal and axial T1 FLAIR, axial T2 fast spin echo with fat saturation, axial T2

FLAIR, diffusion and ADC map. Gadolinium-enhanced axial and coronal T1 FLAIR obtained

also.

 

Comparison: CT head 10/8/2015, MRI head 6/15/2012

 

Findings: 

 

There is mild prominence of the sulci, ventricles, and basal cisterns consistent with

atrophy. Mild, nonspecific T2 hyperintensity noted within white matter. This may be

due to chronic small vessel disease. No abnormal enhancement is identified.

 

There is no restricted diffusion. Gray-white differentiation is normal. There is no

mass effect, midline shift, edema, or hemorrhage. There are no abnormal extra-axial

or intra-axial fluid collections. 

 

The corpus callosum and sella are unremarkable. The brainstem and cerebellum are

unremarkable. Bone marrow signal within the visualized osseous structures appears age

appropriate and unremarkable otherwise.

 

Medial maxillectomies noted bilaterally. There is mucosal thickening within the

visualized paranasal sinuses.

 

Impression: No acute intracranial findings.

 

Age-related findings including atrophy and evidence of chronic small vessel disease

involving white matter tracts.

 

Chronic sinusitis. Status post bilateral medial maxillectomies.

## 2018-03-10 NOTE — EMERGENCY ROOM REPORT
History of Present Illness


General


Chief Complaint:  Dyspnea/Respdistress





Present Illness


HPI


Patient is a 62-year-old male who presented after increased difficulty 

breathing.  Patient gradual onset of symptoms.  He reported having active 

fever.  He had prior history of COPD.  Patient is a former smoker.  He he 

denies any vomiting or diarrhea.  Patient reported having some cough as well as

  chest pain.


Allergies:  


Coded Allergies:  


     SHARK LIVER OIL (Unverified  Allergy, Unknown, 8/6/16)


Uncoded Allergies:  


     SHARK (Allergy, Mild, RASH, 4/11/16)





Patient History


Past Medical History:  see triage record


Reviewed Nursing Documentation:  PMH: Agreed, PSxH: Agreed





Nursing Documentation-PMH


Hx Cardiac Problems:  Yes


Hx Asthma:  Yes


Hx COPD:  Yes


Hx Diabetes:  Yes - 2/2 Steroids


Hx Cancer:  No


Hx Gastrointestinal Problems:  Yes - IBS


Hx Neurological Problems:  No


Hx Cerebrovascular Accident:  No





Review of Systems


All Other Systems:  negative except mentioned in HPI





Physical Exam





Vital Signs








  Date Time  Temp Pulse Resp B/P (MAP) Pulse Ox O2 Delivery O2 Flow Rate FiO2


 


3/10/18 16:58 97.1 69 26 125/85 96 Room Air  





 97.2       


 


3/10/18 18:08        21








Sp02 EP Interpretation:  reviewed, normal


General Appearance:  normal inspection, well appearing, no apparent distress, 

alert, GCS 15


Head:  atraumatic


ENT:  normal ENT inspection, hearing grossly normal, normal voice


Neck:  normal inspection, full range of motion, supple, no bony tend


Respiratory:  no retraction, decreased breath sounds, wheezing


Cardiovascular #1:  regular rate, rhythm, no edema


Gastrointestinal:  normal inspection, normal bowel sounds, non tender, soft, no 

guarding, no hernia


Genitourinary:  no CVA tenderness


Musculoskeletal:  normal inspection, back normal, normal range of motion


Neurologic:  normal inspection, alert, responsive, speech normal


Psychiatric:  normal inspection, judgement/insight normal, mood/affect normal


Skin:  normal inspection, normal color, no rash





Medical Decision Making


Diagnostic Impression:  


 Primary Impression:  


 COPD exacerbation


ER Course


Patient presented for shortness of breath.  Differential included but was not 

limited to anemia, pneumonia, pneumothorax, myocardial infarction, pericardial 

effusion, congestive heart failure, acidosis.  Because of complexity of patient'

s case laboratory testing and imaging studies were ordered.


Laboratory studies were unremarkable. Troponin was noted to be negative.  

Patient given breathing treatments as well as Solu-Medrol with some 

improvement.  EKG interpreted by me showed bifascicular block with a rate of 93 

without acute ST or T wave changes. Dr. Montague was contacted for  

for inpatient management.





Labs








Test


  3/10/18


17:20 3/10/18


20:04


 


White Blood Count


  7.3 K/UL


(4.8-10.8) 


 


 


Red Blood Count


  5.70 M/UL


(4.70-6.10) 


 


 


Hemoglobin


  17.9 G/DL


(14.2-18.0) 


 


 


Hematocrit


  53.4 %


(42.0-52.0) 


 


 


Mean Corpuscular Volume 94 FL (80-99)  


 


Mean Corpuscular Hemoglobin


  31.5 PG


(27.0-31.0) 


 


 


Mean Corpuscular Hemoglobin


Concent 33.6 G/DL


(32.0-36.0) 


 


 


Red Cell Distribution Width


  12.0 %


(11.6-14.8) 


 


 


Platelet Count


  288 K/UL


(150-450) 


 


 


Mean Platelet Volume


  6.9 FL


(6.5-10.1) 


 


 


Neutrophils (%) (Auto)


  62.1 %


(45.0-75.0) 


 


 


Lymphocytes (%) (Auto)


  28.0 %


(20.0-45.0) 


 


 


Monocytes (%) (Auto)


  7.1 %


(1.0-10.0) 


 


 


Eosinophils (%) (Auto)


  1.9 %


(0.0-3.0) 


 


 


Basophils (%) (Auto)


  1.0 %


(0.0-2.0) 


 


 


Prothrombin Time


  9.6 SEC


(9.30-11.50) 


 


 


Prothromb Time International


Ratio 0.9 (0.9-1.1) 


  


 


 


Activated Partial


Thromboplast Time 29 SEC (23-33) 


  


 


 


Sodium Level


  137 MMOL/L


(136-145) 


 


 


Potassium Level


  5.0 MMOL/L


(3.5-5.1) 


 


 


Chloride Level


  98 MMOL/L


() 


 


 


Carbon Dioxide Level


  29 MMOL/L


(21-32) 


 


 


Anion Gap


  10 mmol/L


(5-15) 


 


 


Blood Urea Nitrogen


  15 mg/dL


(7-18) 


 


 


Creatinine


  1.5 MG/DL


(0.55-1.30) 


 


 


Estimat Glomerular Filtration


Rate 57.3 mL/min


(>60) 


 


 


Glucose Level


  293 MG/DL


() 


 


 


Calcium Level


  10.0 MG/DL


(8.5-10.1) 


 


 


Total Bilirubin


  0.8 MG/DL


(0.2-1.0) 


 


 


Aspartate Amino Transf


(AST/SGOT) 71 U/L (15-37) 


  


 


 


Alanine Aminotransferase


(ALT/SGPT) 107 U/L


(12-78) 


 


 


Alkaline Phosphatase


  85 U/L


() 


 


 


Total Creatine Kinase


  320 U/L


() 


 


 


Creatine Kinase MB


  13.3 NG/ML


(0.0-3.6) 


 


 


Creatine Kinase MB Relative


Index 4.1 


  


 


 


Troponin I


  0.000 ng/mL


(0.000-0.056) 


 


 


Pro-B-Type Natriuretic Peptide


  61 pg/mL


(0-125) 


 


 


Total Protein


  8.5 G/DL


(6.4-8.2) 


 


 


Albumin


  3.3 G/DL


(3.4-5.0) 


 


 


Globulin 5.2 g/dL  


 


Albumin/Globulin Ratio 0.6 (1.0-2.7)  


 


Lipase


  71 U/L


() 


 


 


Urine Color  Brown 


 


Urine Appearance  Clear 


 


Urine pH  5 (4.5-8.0) 


 


Urine Specific Gravity


  


  1.025


(1.005-1.035)


 


Urine Protein  2+ (NEGATIVE) 


 


Urine Glucose (UA)  4+ (NEGATIVE) 


 


Urine Ketones  3+ (NEGATIVE) 


 


Urine Occult Blood  4+ (NEGATIVE) 


 


Urine Nitrite


  


  Negative


(NEGATIVE)


 


Urine Bilirubin  1+ (NEGATIVE) 


 


Urine Ictotest  Negative 


 


Urine Urobilinogen


  


  8 MG/DL


(0.0-1.0)


 


Urine Leukocyte Esterase  1+ (NEGATIVE) 


 


Urine RBC


  


  5-10 /HPF (0 -


0)


 


Urine WBC


  


  2-4 /HPF (0 -


0)


 


Urine Squamous Epithelial


Cells 


  None /LPF


(NONE/OCC)


 


Urine Bacteria


  


  Few /HPF


(NONE)


 


Urine Fine Granular Casts


  


  2-4 /LPF


(NONE)











Last Vital Signs








  Date Time  Temp Pulse Resp B/P (MAP) Pulse Ox O2 Delivery O2 Flow Rate FiO2


 


3/10/18 20:45 99.8 83 22 114/78 96 Room Air  21





 99.8       








Status:  unchanged


Disposition:  ADMITTED AS INPATIENT


Condition:  Serious


Referrals:  


MENA MUÑOZ (PCP)











David Stephens Mar 10, 2018 21:29

## 2018-03-11 NOTE — HISTORY AND PHYSICAL
History of Present Illness


General


Date patient seen:  Mar 11, 2018


Time patient seen:  09:00


Reason for Hospitalization:  Dyspnea/Respdistress





Present Illness


HPI


63y/o male with pmh of HTN, COPD, DM2, depression who presents with SOB and 

cough. Pt states he has been having worsening symptoms of cough and SOB for the 

past few weeks. He c/o subjective fever/chills. States he was recently 

hospitalized at another hospital for similar symptoms. He has been taking 

prednisone 5mg daily. He reports increased sputum production. Denies hemoptysis

, n/v, d/c, abd pain, chest pain. Notes increased SOB w/ activity/exertion.





In ED, pt noted to be in some respiratory distress w/ wheezing. He was given 

solumedrol 125mg IV, duonebs w/ some improvement. Trop neg. EKG w/ no acute ST/

T changes. CXR showed no e/o infiltrate.


Allergies:  


Coded Allergies:  


     SHARK LIVER OIL (Unverified  Allergy, Unknown, 8/6/16)


Uncoded Allergies:  


     SHARK (Allergy, Mild, RASH, 4/11/16)





Medication History


Scheduled


Albuterol Sulfate (Proventil Hfa), 2 PUFFS INH BID, (Reported)


Amantadine Hcl* (Symmetrel*), 100 MG ORAL DAILY, (Reported)


Aspirin* (Aspirin*), 81 MG ORAL DAILY, (Reported)


Cholecalciferol (Vitamin D3)* (Vitamin D*), 2,000 UNITS ORAL DAILY, (Reported)


Cyclobenzaprine Hcl (Amrix), 15 MG ORAL DAILY, (Reported)


Dutasteride (Avodart), 0.5 MG ORAL QHS, (Reported)


Formoterol Fumarate (Perforomist), Unknown Dose IH BID, (Reported)


Gabapentin* (Gabapentin*), 600 MG ORAL DAILY, (Reported)


Glycopyrrolate/Formoterol Fum (Bevespi Aerosphere Inhaler), 2 PUFFS IH BID, (

Reported)


Insulin Degludec/Liraglutide (Xultophy 100 Unit-3.6MG/Ml Pen), 40 UNITS SQ QHS, 

(Reported)


Metformin Hcl* (Metformin Hcl*), 500 MG ORAL TWICE A DAY, (Reported)


Metoprolol Tartrate* (Metoprolol Tartrate*), 25 MG ORAL DAILY, (Reported)


Mirtazapine* (Remeron*), 30 MG ORAL BEDTIME, (Reported)


Omeprazole (Omeprazole), 20 MG ORAL DAILY, (Reported)


Prednisone* (Prednisone*), 5 MG ORAL DAILY, (Reported)


Roflumilast (Daliresp), 500 MCG PO DAILY, (Reported)


Tamsulosin Hcl (Tamsulosin Hcl*), 0.8 MG ORAL BEDTIME, (Reported)


Theophylline Anhydrous (Theophylline Anhydrous), 300 MG PO Q12HR, (Reported)





Scheduled PRN


Insulin Aspart (Novolog Flexpen), SQ TID PRN for SLIDING SCALE, (Reported)


Linaclotide (Linzess), 145 MCG PO DAILY PRN for Constipation, (Reported)


Oxycodone Hcl* (Roxicodone*), 30 MG ORAL Q8HR PRN for FOR PAIN (SEE PT COMMENTS)

, (Reported)





Discontinued Medications


Albuterol Sulfate (Ventolin Hfa), 2 PUFFS INH EVERY 6 HOURS PRN for Shortness 

of Breath, (Reported)


   Discontinued Reason: Therapy completed


Albuterol Sulfate* (Proair Hfa*), 1 PUFF INH QID, (Reported)


   Discontinued Reason: Therapy completed


Aspirin/Acetaminophen/Caffeine (Excedrin Extra Strength Caplet), 2 TAB PO DAILY 

PRN for For Pain, (Reported)


   Discontinued Reason: Pt stopped taking med


Budesonide/Formoterol Fumarate (Symbicort 160-4.5 Mcg Inhaler), 1 PUFF INH 

TWICE A DAY, (Reported)


   Discontinued Reason: Therapy completed


Clarithromycin (Clarithromycin), 500 MG ORAL TWICE A DAY, (Reported)


   Discontinued Reason: Therapy completed


Escitalopram Oxalate* (Lexapro*), 20 MG ORAL DAILY, (Reported)


   Discontinued Reason: Therapy completed


Furosemide* (Lasix*), 20 MG ORAL DAILY, (Reported)


   Discontinued Reason: Therapy completed


Insulin Degludec (Tresiba Flextouch U-200), 30 UNIT SQ QHS, (Reported)


   Discontinued Reason: Therapy completed


Methylprednisolone (Medrol), 8 MG PO, (Reported)


   Discontinued Reason: Therapy completed


Mometasone Furoate (Nasonex), 2 SPRAYS NASAL DAILY PRN for ALLERGIES, (Reported)


   Discontinued Reason: Therapy completed


Potassium Chloride* (K-Dur*), 10 MEQ ORAL DAILY, (Reported)


   Discontinued Reason: Therapy completed


Suvorexant (Belsomra), 20 MG PO QHS, (Reported)


   Discontinued Reason: Pt stopped taking med


Theophylline Anhydrous (Theophylline), 100 MG PO EVERY 12 HOURS, (Reported)


   Discontinued Reason: Prescription changed


Tiotropium Bromide* (Spiriva*), 1 PUFF INH DAILY, (Reported)


   Discontinued Reason: Therapy completed





Patient History


Healthcare decision maker


N


Resuscitation status


Full Code


Advanced Directive on File


No





Past Medical/Surgical History


Past Medical/Surgical History:  


(1) Insulin dependent type 2 diabetes mellitus


(2) HTN (hypertension)


(3) Depression


(4) COPD (chronic obstructive pulmonary disease)





Family History


Family History:  


Patient reports no known family medical history.





Social History


Social History:  


(1) Former tobacco use


(2) Lives with roommates





Review of Systems


Constitutional:  Reports: fever, weakness


Eye:  Reports: no symptoms


ENT:  Reports: no symptoms


Respiratory:  Reports: cough, shortness of breath, wheezing


Cardiovascular:  Reports: no symptoms


Gastrointestinal:  Reports: no symptoms


Genitourinary:  Reports: no symptoms


Musculoskeletal:  Reports: no symptoms


Skin:  Reports: no symptoms


Psychiatric:  Reports: no symptoms


Neurological:  Reports: no symptoms


Endocrine:  Reports: no symptoms


Hematologic/Lymphatic:  Reports: no symptoms





Physical Exam


Physical Exam Narrative


General: alert, cooperative, no distress, appears stated age


Head: normocephalic, without obvious abnormality, atraumatic


Eyes: conjunctivae/corneas clear. PERRL, EOM's intact


Throat: lips, mucosa, and tongue normal. MMM


Neck: supple, symmetrical, trachea midline, and no JVD


Lungs: +wheezing b/l


Heart: regular rate and rhythm, S1, S2 normal, no murmur, click, rub or gallop


Abdomen: soft, non-tender, non-distended, bowel sounds normal; no masses or 

organomegaly


Extremities: extremities normal, atraumatic, no cyanosis or edema


Pulses: 2+ and symmetric


Skin: skin color, texture, turgor normal; no rashes or lesions


Neurologic: grossly normal, no focal deficits





Last 24 Hour Vital Signs








  Date Time  Temp Pulse Resp B/P (MAP) Pulse Ox O2 Delivery O2 Flow Rate FiO2


 


3/11/18 08:27  92  118/69    


 


3/11/18 08:03  75 20  98 Room Air  


 


3/11/18 08:00 98.2 92 20 118/69 100 Room Air  21





 98.2       


 


3/11/18 07:54        21


 


3/11/18 07:53  86 18  97 Room Air  


 


3/11/18 04:00 97.3 85 20 122/79 95 Room Air  21





 97.3       


 


3/11/18 04:00  85      


 


3/11/18 01:25  89 20  96 Room Air  21


 


3/11/18 01:05        21


 


3/11/18 01:04  89 20  96 Room Air  21


 


3/11/18 00:00 97.0 85 20 115/85 96 Room Air  21





 97.0       


 


3/11/18 00:00  86      


 


3/10/18 22:42  85      


 


3/10/18 21:20 98.1 86 25 116/83 98 Room Air  21





 98.1       


 


3/10/18 21:15 99.8 83 22 114/78 96 Room Air  21





 99.8       


 


3/10/18 20:45 99.8 83 22 114/78 96 Room Air  21





 99.8       


 


3/10/18 19:40  92 22  100 Room Air  21


 


3/10/18 19:34 97.3 92 22 112/78 94 Room Air  21





 97.3       


 


3/10/18 19:29  91 22  95 Room Air  21


 


3/10/18 19:29        21


 


3/10/18 18:34  96 22  100 Room Air  21


 


3/10/18 18:26 97.1 94 22 120/85 98 Room Air  





 97.1       


 


3/10/18 18:26  94 26   Room Air  21


 


3/10/18 18:08        21


 


3/10/18 18:08  94 26  98 Room Air  


 


3/10/18 18:06  94 26   Room Air  


 


3/10/18 17:11 97.1 69 26 125/85 98 Room Air  





 97.1       


 


3/10/18 16:58 97.1 69 26 125/85 96 Room Air  





 97.2       

















Intake and Output  


 


 3/10/18 3/11/18





 19:00 07:00


 


Intake Total 120 ml 300 ml


 


Output Total  800 ml


 


Balance 120 ml -500 ml


 


  


 


Intake Oral 120 ml 300 ml


 


Output Urine Total  800 ml











Laboratory Tests








Test


  3/10/18


17:20 3/10/18


20:04 3/10/18


20:11 3/11/18


07:50


 


White Blood Count


  7.3 K/UL


(4.8-10.8) 


  


  5.8 K/UL


(4.8-10.8)


 


Red Blood Count


  5.70 M/UL


(4.70-6.10) 


  


  4.83 M/UL


(4.70-6.10)


 


Hemoglobin


  17.9 G/DL


(14.2-18.0) 


  


  15.4 G/DL


(14.2-18.0)


 


Hematocrit


  53.4 %


(42.0-52.0)  H 


  


  44.4 %


(42.0-52.0)


 


Mean Corpuscular Volume 94 FL (80-99)     92 FL (80-99)  


 


Mean Corpuscular Hemoglobin


  31.5 PG


(27.0-31.0)  H 


  


  31.8 PG


(27.0-31.0)  H


 


Mean Corpuscular Hemoglobin


Concent 33.6 G/DL


(32.0-36.0) 


  


  34.5 G/DL


(32.0-36.0)


 


Red Cell Distribution Width


  12.0 %


(11.6-14.8) 


  


  11.9 %


(11.6-14.8)


 


Platelet Count


  288 K/UL


(150-450) 


  


  294 K/UL


(150-450)


 


Mean Platelet Volume


  6.9 FL


(6.5-10.1) 


  


  6.5 FL


(6.5-10.1)


 


Neutrophils (%) (Auto)


  62.1 %


(45.0-75.0) 


  


  83.7 %


(45.0-75.0)  H


 


Lymphocytes (%) (Auto)


  28.0 %


(20.0-45.0) 


  


  15.2 %


(20.0-45.0)  L


 


Monocytes (%) (Auto)


  7.1 %


(1.0-10.0) 


  


  0.7 %


(1.0-10.0)  L


 


Eosinophils (%) (Auto)


  1.9 %


(0.0-3.0) 


  


  0.0 %


(0.0-3.0)


 


Basophils (%) (Auto)


  1.0 %


(0.0-2.0) 


  


  0.4 %


(0.0-2.0)


 


Prothrombin Time


  9.6 SEC


(9.30-11.50) 


  


  


 


 


Prothromb Time International


Ratio 0.9 (0.9-1.1)  


  


  


  


 


 


Activated Partial


Thromboplast Time 29 SEC (23-33)


  


  


  


 


 


Sodium Level


  137 MMOL/L


(136-145) 


  


  135 MMOL/L


(136-145)  L


 


Potassium Level


  5.0 MMOL/L


(3.5-5.1) 


  


  4.2 MMOL/L


(3.5-5.1)


 


Chloride Level


  98 MMOL/L


() 


  


  98 MMOL/L


()


 


Carbon Dioxide Level


  29 MMOL/L


(21-32) 


  


  23 MMOL/L


(21-32)


 


Anion Gap


  10 mmol/L


(5-15) 


  


  14 mmol/L


(5-15)


 


Blood Urea Nitrogen


  15 mg/dL


(7-18) 


  


  16 mg/dL


(7-18)


 


Creatinine


  1.5 MG/DL


(0.55-1.30)  H 


  


  1.1 MG/DL


(0.55-1.30)


 


Estimat Glomerular Filtration


Rate 57.3 mL/min


(>60) 


  


  > 60 mL/min


(>60)


 


Glucose Level


  293 MG/DL


()  H 


  


  273 MG/DL


()  H


 


Calcium Level


  10.0 MG/DL


(8.5-10.1) 


  


  9.2 MG/DL


(8.5-10.1)


 


Total Bilirubin


  0.8 MG/DL


(0.2-1.0) 


  


  


 


 


Aspartate Amino Transf


(AST/SGOT) 71 U/L (15-37)


H 


  


  


 


 


Alanine Aminotransferase


(ALT/SGPT) 107 U/L


(12-78)  H 


  


  


 


 


Alkaline Phosphatase


  85 U/L


() 


  


  


 


 


Total Creatine Kinase


  320 U/L


()  H 


  


  


 


 


Creatine Kinase MB


  13.3 NG/ML


(0.0-3.6)  H 


  


  


 


 


Creatine Kinase MB Relative


Index 4.1  


  


  


  


 


 


Troponin I


  0.000 ng/mL


(0.000-0.056) 


  


  


 


 


Pro-B-Type Natriuretic Peptide


  61 pg/mL


(0-125) 


  


  37 pg/mL


(0-125)


 


Total Protein


  8.5 G/DL


(6.4-8.2)  H 


  


  


 


 


Albumin


  3.3 G/DL


(3.4-5.0)  L 


  


  


 


 


Globulin 5.2 g/dL     


 


Albumin/Globulin Ratio


  0.6 (1.0-2.7)


L 


  


  


 


 


Lipase


  71 U/L


()  L 


  


  


 


 


Urine Color  Brown    


 


Urine Appearance  Clear    


 


Urine pH  5 (4.5-8.0)    


 


Urine Specific Gravity


  


  1.025


(1.005-1.035) 


  


 


 


Urine Protein


  


  2+ (NEGATIVE)


H 


  


 


 


Urine Glucose (UA)


  


  4+ (NEGATIVE)


H 


  


 


 


Urine Ketones


  


  3+ (NEGATIVE)


H 


  


 


 


Urine Occult Blood


  


  4+ (NEGATIVE)


H 


  


 


 


Urine Nitrite


  


  Negative


(NEGATIVE) 


  


 


 


Urine Bilirubin


  


  1+ (NEGATIVE)


H 


  


 


 


Urine Ictotest  Negative    


 


Urine Urobilinogen


  


  8 MG/DL


(0.0-1.0)  H 


  


 


 


Urine Leukocyte Esterase


  


  1+ (NEGATIVE)


H 


  


 


 


Urine RBC


  


  5-10 /HPF (0 -


0)  H 


  


 


 


Urine WBC


  


  2-4 /HPF (0 -


0) 


  


 


 


Urine Squamous Epithelial


Cells 


  None /LPF


(NONE/OCC) 


  


 


 


Urine Bacteria


  


  Few /HPF


(NONE) 


  


 


 


Urine Fine Granular Casts


  


  2-4 /LPF


(NONE)  H 


  


 


 


Arterial Blood pH


  


  


  7.410


(7.350-7.450) 


 


 


Arterial Blood Partial


Pressure CO2 


  


  30.2 mmHg


(35.0-45.0)  L 


 


 


Arterial Blood Partial


Pressure O2 


  


  79.7 mmHg


(75.0-100.0) 


 


 


Arterial Blood HCO3


  


  


  19.0 mmol/L


(22.0-26.0)  L 


 


 


Arterial Blood Oxygen


Saturation 


  


  95.3 %


(92.0-98.0) 


 


 


Arterial Blood Base Excess   -4.1   


 


Julio Test   Positive   


 


Magnesium Level


  


  


  


  2.1 MG/DL


(1.8-2.4)








Height (Feet):  5


Height (Inches):  11.00


Weight (Pounds):  178


Medications





Current Medications








 Medications


  (Trade)  Dose


 Ordered  Sig/Amina


 Route


 PRN Reason  Start Time


 Stop Time Status Last Admin


Dose Admin


 


 Acetaminophen


  (Tylenol)  650 mg  Q4H  PRN


 ORAL


 Mild Pain (Pain Scale 1-3)  3/10/18 20:15


 4/9/18 20:14   


 


 


 Acetaminophen


  (Tylenol)  650 mg  Q4H  PRN


 ORAL


 fever  3/10/18 20:15


 4/9/18 20:14   


 


 


 Al Hydroxide/Mg


 Hydroxide


  (Mylanta II)  30 ml  Q6H  PRN


 ORAL


 dyspepsia  3/10/18 20:15


 4/9/18 20:14   


 


 


 Albuterol/


 Ipratropium


  (Albuterol/


 Ipratropium)  3 ml  Q4H  PRN


 HHN


 Shortness of Breath  3/10/18 20:15


 3/15/18 20:14   


 


 


 Albuterol/


 Ipratropium


  (Albuterol/


 Ipratropium)  3 ml  Q6HRT


 HHN


   3/11/18 01:00


 3/16/18 00:59  3/11/18 07:53


 


 


 Amantadine HCl


  (Symmetrel)  100 mg  DAILY


 ORAL


   3/11/18 09:00


 4/10/18 08:59  3/11/18 10:35


 


 


 Aspirin


  (ASA)  81 mg  DAILY


 ORAL


   3/11/18 09:00


 4/10/18 08:59  3/11/18 08:26


 


 


 Bisacodyl


  (Dulcolax)  10 mg  DAILYPRN  PRN


 RECTAL


 Constipation  3/10/18 20:15


 4/9/18 20:14   


 


 


 Dextrose


  (Dextrose 50%)    STAT  PRN


 IV


 Hypoglycemia  3/10/18 20:15


 4/9/18 20:14   


 


 


 Dextrose


  (Dextrose 50%)    STAT  PRN


 IV


 Hypoglycemia  3/10/18 20:45


 4/9/18 20:44   


 


 


 Docusate Sodium


  (Colace)  100 mg  EVERY 12  HOURS


 ORAL


   3/10/18 21:00


 4/9/18 20:59  3/11/18 08:27


 


 


 Gabapentin


  (Neurontin)  600 mg  DAILY


 ORAL


   3/11/18 09:00


 4/10/18 08:59  3/11/18 08:27


 


 


 Heparin Sodium


  (Porcine)


  (Heparin 5000


 units/ml)  5,000 units  EVERY 12  HOURS


 SUBQ


   3/10/18 21:00


 4/9/18 20:59  3/11/18 08:30


 


 


 Insulin Aspart


  (NovoLOG)    BEFORE MEALS AND  HS


 SUBQ


   3/10/18 21:00


 4/9/18 20:59  3/11/18 11:25


 


 


 Metoprolol


 Tartrate


  (Lopressor)  25 mg  DAILY


 ORAL


   3/11/18 09:00


 4/10/18 08:59  3/11/18 08:27


 


 


 Mirtazapine


  (Remeron)  30 mg  BEDTIME


 ORAL


   3/10/18 21:00


 4/9/18 20:59  3/10/18 22:40


 


 


 Ondansetron HCl


  (Zofran)  4 mg  Q6H  PRN


 IVP


 Nausea & Vomiting  3/10/18 20:15


 4/9/18 20:14   


 


 


 Pantoprazole


  (Protonix)  40 mg  DAILY


 ORAL


   3/11/18 09:00


 4/10/18 08:59  3/11/18 08:28


 


 


 Polyethylene


 Glycol


  (Miralax)  17 gm  DAILYPRN  PRN


 ORAL


 Constipation  3/10/18 20:15


 4/9/18 20:14   


 


 


 Sodium Chloride  1,000 ml @ 


 75 mls/hr  [id number]


 IV


   3/10/18 20:15


 4/9/18 20:14  3/11/18 09:56


 


 


 Tamsulosin HCl


  (Flomax)  0.8 mg  BEDTIME


 ORAL


   3/10/18 21:00


 4/9/18 20:59  3/10/18 22:40


 


 


 Vitamin D


  (Vitamin D)  2,000 intlu  DAILY


 ORAL


   3/11/18 09:00


 4/10/18 08:59  3/11/18 08:28


 











Assessment/Plan


Problem List:  


(1) COPD exacerbation


ICD Codes:  J44.1 - Chronic obstructive pulmonary disease with (acute) 

exacerbation


SNOMED:  632695771


(2) Purulent bronchitis


ICD Codes:  J41.1 - Mucopurulent chronic bronchitis


SNOMED:  26004014


(3) HTN (hypertension)


ICD Codes:  I10 - Essential (primary) hypertension


SNOMED:  01905890


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


ICD Codes:  E11.9 - Type 2 diabetes mellitus without complications; Z79.4 - 

Long term (current) use of insulin


SNOMED:  929860147


(5) Depression


ICD Codes:  F32.9 - Major depressive disorder, single episode, unspecified


SNOMED:  26108975


Status:  stable


Assessment/Plan


Admit inpt


Pulmonology consulted


Check ABG


s/p solumedrol 125mg IV in ED


Cont steroids per pulm: solumedrol 60mg IV BID and taper as tolerated


Cont duonebs ATC and PRN


Will hold off on abx at this time as clinically pt has no evidence of infection


Check TTE to ensure no cardiac etiology to SOB


Cont home meds


JOHN


Pain control, supportive care, bowel regimen





DVT Prophylaxis:   SCD, HSQ


Code Status:            Full


Hospital Classification Declaration: Based on this initial evaluation, and 

depending on the patient's clinical course, I anticipate that this patient will 

require hospitalization for 2-3 days for COPD exacerbation and close respiratory

/hemodynamic monitoring.





Disposition: Once the patient is stable to leave the hospital, I anticipate the 

patient will likely be discharged to the following environment: home with HH vs 

SNF





I spent 70 minutes on this patient's case, and >50% were dedicated to 

counseling and/or care coordination. Discussed with patient/family, nursing 

staff, SW/CM, pulmonology regarding clinical status, treatment course, and 

disposition planning.





Time of note may not reflect time of encounter.











Elder Cobb M.D. Mar 11, 2018 12:38

## 2018-03-11 NOTE — DIAGNOSTIC IMAGING REPORT
Indication: Shortness of breath

 

Technique: XRAY Chest 1v

 

Comparison: 3/24/2017

 

Findings: Cardiomediastinal silhouette is stable. Atherosclerotic changes are noted.

There is no consolidation or pleural effusion. Cervical hardware is present.

Degenerative changes of the spine are noted.

 

Impression: 

 

No obvious acute cardiopulmonary disease.

## 2018-03-11 NOTE — CARDIOLOGY REPORT
--------------- APPROVED REPORT --------------





EKG Measurement

Heart [id number]

GA 

[id number]

[id number]

[id number]

[id number]





Normal sinus rhythm

Right bundle branch block

Left anterior fascicular block

*** Bifascicular block ***

Left ventricular hypertrophy with repolarization abnormality

Cannot rule out Septal infarct, age undetermined

Abnormal ECG

## 2018-03-12 NOTE — GENERAL PROGRESS NOTE
Assessment/Plan


Problem List:  


(1) COPD exacerbation


ICD Codes:  J44.1 - Chronic obstructive pulmonary disease with (acute) 

exacerbation


SNOMED:  469234348


(2) Purulent bronchitis


ICD Codes:  J41.1 - Mucopurulent chronic bronchitis


SNOMED:  92452873


(3) HTN (hypertension)


ICD Codes:  I10 - Essential (primary) hypertension


SNOMED:  72579566


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


ICD Codes:  E11.9 - Type 2 diabetes mellitus without complications; Z79.4 - 

Long term (current) use of insulin


SNOMED:  432710000


(5) Depression


ICD Codes:  F32.9 - Major depressive disorder, single episode, unspecified


SNOMED:  80299195


Status:  stable


Assessment/Plan


Pulmonology consulted


s/p solumedrol 125mg IV in ED


Cont steroids per pulm: decr to solumedrol 60mg IV daily and taper as tolerated


Cont duonebs ATC and PRN


Azithro 500mg daily


Check TTE to ensure no cardiac etiology to SOB


Cont home meds


JOHN


Incr Levemir to 35U daily


Pain control, supportive care, bowel regimen





DVT Prophylaxis:   SCD, HSQ


Code Status:            Full


Hospital Classification Declaration: Based on this initial evaluation, and 

depending on the patient's clinical course, I anticipate that this patient will 

require hospitalization for 1-2 days for COPD exacerbation and close respiratory

/hemodynamic monitoring.





Disposition: Once the patient is stable to leave the hospital, I anticipate the 

patient will likely be discharged to the following environment: home with HH vs 

SNF





Discussed with patient/family, nursing staff, SW/CM, pulmonology regarding 

clinical status, treatment course, and disposition planning. D/w pulm re 

tapering steroids





Time of note may not reflect time of encounter.





Subjective


Date patient seen:  Mar 12, 2018


Time patient seen:  11:20


ROS Limited/Unobtainable:  No


Constitutional:  Reports: weakness


HEENT:  Reports: no symptoms


Cardiovascular:  Reports: no symptoms


Respiratory:  Reports: cough, shortness of breath


Gastrointestinal/Abdominal:  Reports: no symptoms


Genitourinary:  Reports: no symptoms


Neurologic/Psychiatric:  Reports: no symptoms


Endocrine:  Reports: no symptoms


Hematologic/Lymphatic:  Reports: no symptoms


Allergies:  


Coded Allergies:  


     SHARK LIVER OIL (Unverified  Allergy, Unknown, 8/6/16)


Uncoded Allergies:  


     SHARK (Allergy, Mild, RASH, 4/11/16)


All Systems:  reviewed and negative except above


Subjective


No acute o/n events





Cont to have cough, SOB and wheezing but feeling slightly better today. 

Ambulated and felt more SOB. Denies f/c, n/v, d/c, chest pain, abd pain





Objective





Last 24 Hour Vital Signs








  Date Time  Temp Pulse Resp B/P (MAP) Pulse Ox O2 Delivery O2 Flow Rate FiO2


 


3/12/18 19:32  86 18  99 Room Air  21


 


3/12/18 19:29 97.5 76 20 112/74 98 Simple Mask  





 97.5       


 


3/12/18 19:24        21


 


3/12/18 19:24  83 20  96 Room Air  21


 


3/12/18 16:00 98.2 86 19 116/59 93   





 98.2       


 


3/12/18 13:07        21


 


3/12/18 13:07  83 18  98 Room Air  21


 


3/12/18 12:54  80 20  96 Room Air  21


 


3/12/18 12:00 97.1 83 20 118/77 95 Room Air  





 97.1       


 


3/12/18 08:59  91  118/68    


 


3/12/18 08:00 97.3 91 20 118/68 94 Room Air  21





 97.3       


 


3/12/18 08:00  92      


 


3/12/18 07:02        21


 


3/12/18 07:02  80 20  98 Room Air  21


 


3/12/18 06:52  77 20  97 Room Air  21


 


3/12/18 04:00 97.3 81 21 124/80 95 Room Air  21





 97.3       


 


3/12/18 04:00  78      


 


3/12/18 00:57  82 20  95 Room Air  21


 


3/12/18 00:46        21


 


3/12/18 00:45  82 20  95 Room Air  21


 


3/12/18 00:00 97.1 77 20 118/81 95 Room Air  21





 97.1       


 


3/12/18 00:00  81      

















Intake and Output  


 


 3/11/18 3/12/18





 19:00 07:00


 


Intake Total 1737 ml 


 


Output Total 1000 ml 1500 ml


 


Balance 737 ml -1500 ml


 


  


 


Intake Oral 840 ml 


 


IV Total 897 ml 


 


Output Urine Total 1000 ml 1500 ml








Height (Feet):  5


Height (Inches):  11.00


Weight (Pounds):  178


Objective


General: alert, cooperative, no distress, appears stated age


Head: normocephalic, without obvious abnormality, atraumatic


Eyes: conjunctivae/corneas clear. PERRL, EOM's intact


Throat: lips, mucosa, and tongue normal. MMM


Neck: supple, symmetrical, trachea midline, and no JVD


Lungs: +wheezing b/l


Heart: regular rate and rhythm, S1, S2 normal, no murmur, click, rub or gallop


Abdomen: soft, non-tender, non-distended, bowel sounds normal; no masses or 

organomegaly


Extremities: extremities normal, atraumatic, no cyanosis or edema


Pulses: 2+ and symmetric


Skin: skin color, texture, turgor normal; no rashes or lesions


Neurologic: grossly normal, no focal deficits











Elder Cobb M.D. Mar 12, 2018 20:40

## 2018-03-12 NOTE — CONSULTATION
History of Present Illness


General


Chief Complaint:  Dyspnea/Respdistress





Present Illness


Allergies:  


Coded Allergies:  


     SHARK LIVER OIL (Unverified  Allergy, Unknown, 8/6/16)


Uncoded Allergies:  


     SHARK (Allergy, Mild, RASH, 4/11/16)





Medication History


Scheduled


Albuterol Sulfate (Proventil Hfa), 2 PUFFS INH BID, (Reported)


Amantadine Hcl* (Symmetrel*), 100 MG ORAL DAILY, (Reported)


Aspirin* (Aspirin*), 81 MG ORAL DAILY, (Reported)


Cholecalciferol (Vitamin D3)* (Vitamin D*), 2,000 UNITS ORAL DAILY, (Reported)


Cyclobenzaprine Hcl (Amrix), 15 MG ORAL DAILY, (Reported)


Dutasteride (Avodart), 0.5 MG ORAL QHS, (Reported)


Formoterol Fumarate (Perforomist), Unknown Dose IH BID, (Reported)


Gabapentin* (Gabapentin*), 600 MG ORAL DAILY, (Reported)


Glycopyrrolate/Formoterol Fum (Bevespi Aerosphere Inhaler), 2 PUFFS IH BID, (

Reported)


Insulin Degludec/Liraglutide (Xultophy 100 Unit-3.6MG/Ml Pen), 40 UNITS SQ QHS, 

(Reported)


Metformin Hcl* (Metformin Hcl*), 500 MG ORAL TWICE A DAY, (Reported)


Metoprolol Tartrate* (Metoprolol Tartrate*), 25 MG ORAL DAILY, (Reported)


Mirtazapine* (Remeron*), 30 MG ORAL BEDTIME, (Reported)


Omeprazole (Omeprazole), 20 MG ORAL DAILY, (Reported)


Prednisone* (Prednisone*), 5 MG ORAL DAILY, (Reported)


Roflumilast (Daliresp), 500 MCG PO DAILY, (Reported)


Tamsulosin Hcl (Tamsulosin Hcl*), 0.8 MG ORAL BEDTIME, (Reported)


Theophylline Anhydrous (Theophylline Anhydrous), 300 MG PO Q12HR, (Reported)





Scheduled PRN


Insulin Aspart (Novolog Flexpen), SQ TID PRN for SLIDING SCALE, (Reported)


Linaclotide (Linzess), 145 MCG PO DAILY PRN for Constipation, (Reported)


Oxycodone Hcl* (Roxicodone*), 30 MG ORAL Q8HR PRN for FOR PAIN (SEE PT COMMENTS)

, (Reported)





Discontinued Medications


Albuterol Sulfate (Ventolin Hfa), 2 PUFFS INH EVERY 6 HOURS PRN for Shortness 

of Breath, (Reported)


   Discontinued Reason: Therapy completed


Albuterol Sulfate* (Proair Hfa*), 1 PUFF INH QID, (Reported)


   Discontinued Reason: Therapy completed


Aspirin/Acetaminophen/Caffeine (Excedrin Extra Strength Caplet), 2 TAB PO DAILY 

PRN for For Pain, (Reported)


   Discontinued Reason: Pt stopped taking med


Budesonide/Formoterol Fumarate (Symbicort 160-4.5 Mcg Inhaler), 1 PUFF INH 

TWICE A DAY, (Reported)


   Discontinued Reason: Therapy completed


Clarithromycin (Clarithromycin), 500 MG ORAL TWICE A DAY, (Reported)


   Discontinued Reason: Therapy completed


Escitalopram Oxalate* (Lexapro*), 20 MG ORAL DAILY, (Reported)


   Discontinued Reason: Therapy completed


Furosemide* (Lasix*), 20 MG ORAL DAILY, (Reported)


   Discontinued Reason: Therapy completed


Insulin Degludec (Tresiba Flextouch U-200), 30 UNIT SQ QHS, (Reported)


   Discontinued Reason: Therapy completed


Methylprednisolone (Medrol), 8 MG PO, (Reported)


   Discontinued Reason: Therapy completed


Mometasone Furoate (Nasonex), 2 SPRAYS NASAL DAILY PRN for ALLERGIES, (Reported)


   Discontinued Reason: Therapy completed


Potassium Chloride* (K-Dur*), 10 MEQ ORAL DAILY, (Reported)


   Discontinued Reason: Therapy completed


Suvorexant (Belsomra), 20 MG PO QHS, (Reported)


   Discontinued Reason: Pt stopped taking med


Theophylline Anhydrous (Theophylline), 100 MG PO EVERY 12 HOURS, (Reported)


   Discontinued Reason: Prescription changed


Tiotropium Bromide* (Spiriva*), 1 PUFF INH DAILY, (Reported)


   Discontinued Reason: Therapy completed





Patient History


Healthcare decision maker


N


Resuscitation status


Full Code


Advanced Directive on File


No





Physical Exam





Last 24 Hour Vital Signs








  Date Time  Temp Pulse Resp B/P (MAP) Pulse Ox O2 Delivery O2 Flow Rate FiO2


 


3/12/18 13:07        21


 


3/12/18 13:07  83 18  98 Room Air  21


 


3/12/18 12:54  80 20  96 Room Air  21


 


3/12/18 12:00 97.1 83 20 118/77 95 Room Air  





 97.1       


 


3/12/18 08:59  91  118/68    


 


3/12/18 08:00 97.3 91 20 118/68 94 Room Air  21





 97.3       


 


3/12/18 08:00  92      


 


3/12/18 07:02        21


 


3/12/18 07:02  80 20  98 Room Air  21


 


3/12/18 06:52  77 20  97 Room Air  21


 


3/12/18 04:00 97.3 81 21 124/80 95 Room Air  21





 97.3       


 


3/12/18 04:00  78      


 


3/12/18 00:57  82 20  95 Room Air  21


 


3/12/18 00:46        21


 


3/12/18 00:45  82 20  95 Room Air  21


 


3/12/18 00:00 97.1 77 20 118/81 95 Room Air  21





 97.1       


 


3/12/18 00:00  81      


 


3/11/18 20:00 97.7 83 20 115/76 95 Room Air  21





 97.7       


 


3/11/18 20:00  84      


 


3/11/18 19:19  80 20  98 Room Air  21


 


3/11/18 19:02        21


 


3/11/18 19:01  80 20  98 Room Air  21


 


3/11/18 16:00 97.7 81 20 113/69 97 Room Air  21





 97.7       


 


3/11/18 16:00  79      

















Intake and Output  


 


 3/11/18 3/12/18





 19:00 07:00


 


Intake Total 1737 ml 


 


Output Total 1000 ml 1500 ml


 


Balance 737 ml -1500 ml


 


  


 


Intake Oral 840 ml 


 


IV Total 897 ml 


 


Output Urine Total 1000 ml 1500 ml








Height (Feet):  5


Height (Inches):  11.00


Weight (Pounds):  178


Medications





Current Medications








 Medications


  (Trade)  Dose


 Ordered  Sig/Amina


 Route


 PRN Reason  Start Time


 Stop Time Status Last Admin


Dose Admin


 


 Acetaminophen


  (Tylenol)  650 mg  Q4H  PRN


 ORAL


 Mild Pain (Pain Scale 1-3)  3/12/18 11:45


 4/9/18 11:44   


 


 


 Acetaminophen


  (Tylenol)  650 mg  Q4H  PRN


 ORAL


 fever (temp>100.5F)  3/12/18 11:45


 4/9/18 11:44   


 


 


 Al Hydroxide/Mg


 Hydroxide


  (Mylanta II)  30 ml  Q6H  PRN


 ORAL


 dyspepsia  3/12/18 11:45


 4/9/18 11:44   


 


 


 Albuterol/


 Ipratropium


  (Albuterol/


 Ipratropium)  3 ml  Q4H  PRN


 HHN


 Shortness of Breath  3/12/18 11:45


 3/15/18 11:44   


 


 


 Albuterol/


 Ipratropium


  (Albuterol/


 Ipratropium)  3 ml  Q6HRT


 HHN


   3/12/18 13:00


 3/16/18 00:59  3/12/18 12:54


 


 


 Amantadine HCl


  (Symmetrel)  100 mg  DAILY


 ORAL


   3/13/18 09:00


 4/10/18 08:59   


 


 


 Aspirin


  (ASA)  81 mg  DAILY


 ORAL


   3/13/18 09:00


 4/10/18 08:59   


 


 


 Bisacodyl


  (Dulcolax)  10 mg  DAILYPRN  PRN


 RECTAL


 Constipation  3/12/18 11:45


 4/9/18 11:44   


 


 


 Dextrose


  (Dextrose 50%)    STAT  PRN


 IV


 Hypoglycemia  3/12/18 11:45


 4/9/18 11:44   


 


 


 Docusate Sodium


  (Colace)  100 mg  EVERY 12  HOURS


 ORAL


   3/12/18 21:00


 4/9/18 20:59   


 


 


 Gabapentin


  (Neurontin)  600 mg  DAILY


 ORAL


   3/13/18 09:00


 4/10/18 08:59   


 


 


 Heparin Sodium


  (Porcine)


  (Heparin 5000


 units/ml)  5,000 units  EVERY 12  HOURS


 SUBQ


   3/12/18 21:00


 4/9/18 20:59   


 


 


 Insulin Aspart


  (NovoLOG)    BEFORE MEALS AND  HS


 SUBQ


   3/12/18 12:00


 4/9/18 11:59  3/12/18 13:23


 


 


 Insulin Detemir


  (Levemir)  24 units  Q24H


 SUBQ


   3/12/18 18:00


 4/10/18 17:59   


 


 


 Metformin HCl


  (Glucophage)  500 mg  TWICE A  DAY


 ORAL


   3/12/18 18:00


 4/10/18 17:59   


 


 


 Methylprednisolone


 Sodium Succinate


  (Solu-MEDROL)  60 mg  EVERY 12  HOURS


 IVP


   3/12/18 21:00


 4/10/18 12:59   


 


 


 Metoprolol


 Tartrate


  (Lopressor)  25 mg  DAILY


 ORAL


   3/13/18 09:00


 4/10/18 08:59   


 


 


 Mirtazapine


  (Remeron)  30 mg  BEDTIME


 ORAL


   3/12/18 21:00


 4/9/18 20:59   


 


 


 Ondansetron HCl


  (Zofran)  4 mg  Q6H  PRN


 IVP


 Nausea & Vomiting  3/12/18 11:45


 4/9/18 11:44   


 


 


 Pantoprazole


  (Protonix)  40 mg  DAILY


 ORAL


   3/13/18 09:00


 4/10/18 08:59   


 


 


 Polyethylene


 Glycol


  (Miralax)  17 gm  DAILYPRN  PRN


 ORAL


 Constipation  3/12/18 11:45


 4/9/18 11:44   


 


 


 Sodium Chloride  1,000 ml @ 


 75 mls/hr  [id number]


 IV


   3/12/18 12:00


 4/9/18 11:59  3/12/18 13:24


 


 


 Tamsulosin HCl


  (Flomax)  0.8 mg  BEDTIME


 ORAL


   3/12/18 21:00


 4/9/18 20:59   


 


 


 Vitamin D


  (Vitamin D)  2,000 intlu  DAILY


 ORAL


   3/13/18 09:00


 4/10/18 08:59   


 

















BARB SUE Mar 12, 2018 15:38

## 2018-03-13 NOTE — CARDIOLOGY REPORT
--------------- APPROVED REPORT --------------





EXAM: Two-dimensional and M-mode echocardiogram with Doppler and color 

Doppler.



INDICATION

SOB



M-Mode DIMENSIONS 

IVSd0.6 (0.7-1.1cm)Left Atrium (MM)3.1 (1.6-4.0cm)

LVDd6.9 (3.5-5.6cm)Aortic Root3.3 (2.0-3.7cm)

PWd1.3 (0.7-1.1cm)Aortic Cusp Exc.2.0 (1.5-2.0cm)



LVDs4.6 (2.5-4.0cm)

PWs1.4 cm





Mild left ventricular enlargement,Normal  systolic function and wall motion.

Left ventricular ejection fraction estimated to be 55-60%.

No evidence of  left ventricular hypertrophy.

No evidence of pericardial or pleural effusion.

Mild left atrial enlargement by 2D.

Moderate right atrial enlargement by 2D.

Focal aortic valve sclerosis with adequate cusp excursion.

Thickened mitral valve leaflets with normal excursion.

Mild mitral annulus and aortic root calcification.

Pulmonic valve not well visualized.

Normal tricuspid valve structure.

IVC is normal in size and collapsible with respiration.



A  color flow and spectral Doppler study was performed and revealed:

Trace aortic regurgitation.

Mild mitral regurgitation.

Mitral inflow velocities indicates normla  left ventricular diastolic function.

Trace tricuspid regurgitation.

Tricuspid  systolic velocities suggests peak right ventricular systolic pressure of  24 

mmHg

Pulmonic regurgitation present.

## 2018-03-13 NOTE — PULMONOLOGY PROGRESS NOTE
Assessment/Plan


Problems:  


(1) COPD exacerbation


(2) Diabetes


(3) Dyspnea


(4) Purulent bronchitis


Assessment/Plan


taper steroids


check sputum


chest pt


respiratory treatment


dvt prophylaxis


pt wants to go to a nursing home





Subjective


ROS Limited/Unobtainable:  No


Constitutional:  Reports: no symptoms


HEENT:  Repors: no symptoms


Allergies:  


Coded Allergies:  


     SHARK LIVER OIL (Unverified  Allergy, Unknown, 8/6/16)


Uncoded Allergies:  


     SHARK (Allergy, Mild, RASH, 4/11/16)





Objective





Last 24 Hour Vital Signs








  Date Time  Temp Pulse Resp B/P (MAP) Pulse Ox O2 Delivery O2 Flow Rate FiO2


 


3/13/18 20:16 97.9 83 19 138/93 96   





 97.9       


 


3/13/18 19:55  74 18  97 Room Air  21


 


3/13/18 19:55        21


 


3/13/18 19:41  76 20  96 Room Air  21


 


3/13/18 16:00 97.7 87 24 128/73 97   





 97.7       


 


3/13/18 13:29        21


 


3/13/18 13:29  81 18  99 Room Air  21


 


3/13/18 13:29  80 20  95 Room Air  21


 


3/13/18 12:00 97.9 67 26 121/79 92   





 97.9       


 


3/13/18 10:16  84 18  98 Room Air  21


 


3/13/18 10:16        21


 


3/13/18 10:15  82 20  94 Room Air  21


 


3/13/18 08:46  77  117/76    


 


3/13/18 08:00 97.5 77 29 117/76 100   





 97.5       


 


3/13/18 07:29        21


 


3/13/18 07:29  80 20  92 Room Air  21


 


3/13/18 07:29  81 18  98 Room Air  21


 


3/13/18 03:49 98.2 77 20 133/95 98 Room Air  





 98.2       


 


3/12/18 23:58  82 18  98 Room Air  21


 


3/12/18 23:51  79 20  97 Room Air  21


 


3/12/18 23:51        21


 


3/12/18 23:40 97.5 79 20 126/82 97 Room Air  





 97.5       

















Intake and Output  


 


 3/12/18 3/13/18





 19:00 07:00


 


Intake Total 776 ml 


 


Output Total 300 ml 500 ml


 


Balance 476 ml -500 ml


 


  


 


Intake Oral 476 ml 


 


IV Total 300 ml 


 


Output Urine Total 300 ml 500 ml


 


# Voids 2 3








Objective


General Appearance:  cachectic


HEENT:  normocephalic, atraumatic, anicteric


Respiratory/Chest:  chest wall non-tender, rhonchi L>R


Breasts:  no masses


Cardiovascular:  normal peripheral pulses


Abdomen:  normal bowel sounds


Genitourinary:  normal external genitalia


Laboratory Tests


3/13/18 04:45: 


White Blood Count 7.8, Red Blood Count 4.21L, Hemoglobin 13.5L, Hematocrit 39.7L

, Mean Corpuscular Volume 94, Mean Corpuscular Hemoglobin 32.1H, Mean 

Corpuscular Hemoglobin Concent 34.1, Red Cell Distribution Width 12.1, Platelet 

Count 276, Mean Platelet Volume 6.1L, Neutrophils (%) (Auto) 76.1H, Lymphocytes 

(%) (Auto) 15.9L, Monocytes (%) (Auto) 6.3, Eosinophils (%) (Auto) 0.1, 

Basophils (%) (Auto) 1.6, Sodium Level 139, Potassium Level 3.9, Chloride Level 

104, Carbon Dioxide Level 27, Anion Gap 9, Blood Urea Nitrogen 14, Creatinine 

1.1, Estimat Glomerular Filtration Rate > 60, Glucose Level 186H, Calcium Level 

8.6, Phosphorus Level 2.5, Magnesium Level 1.7L, Total Bilirubin 0.3, Aspartate 

Amino Transf (AST/SGOT) 37, Alanine Aminotransferase (ALT/SGPT) 58, Alkaline 

Phosphatase 60, Total Protein 6.4, Albumin 2.3L, Globulin 4.1, Albumin/Globulin 

Ratio 0.6L





Current Medications








 Medications


  (Trade)  Dose


 Ordered  Sig/Amina


 Route


 PRN Reason  Start Time


 Stop Time Status Last Admin


Dose Admin


 


 Acetaminophen


  (Tylenol)  650 mg  Q4H  PRN


 ORAL


 Mild Pain (Pain Scale 1-3)  3/12/18 11:45


 4/9/18 11:44   


 


 


 Acetaminophen


  (Tylenol)  650 mg  Q4H  PRN


 ORAL


 fever (temp>100.5F)  3/12/18 11:45


 4/9/18 11:44   


 


 


 Al Hydroxide/Mg


 Hydroxide


  (Mylanta II)  30 ml  Q6H  PRN


 ORAL


 dyspepsia  3/12/18 11:45


 4/9/18 11:44   


 


 


 Albuterol/


 Ipratropium


  (Albuterol/


 Ipratropium)  3 ml  Q4H  PRN


 HHN


 Shortness of Breath  3/12/18 11:45


 3/15/18 11:44  3/13/18 10:15


 


 


 Albuterol/


 Ipratropium


  (Albuterol/


 Ipratropium)  3 ml  Q6HRT


 HHN


   3/12/18 13:00


 3/16/18 00:59  3/13/18 19:41


 


 


 Amantadine HCl


  (Symmetrel)  100 mg  DAILY


 ORAL


   3/13/18 09:00


 4/10/18 08:59  3/13/18 08:46


 


 


 Aspirin


  (ASA)  81 mg  DAILY


 ORAL


   3/13/18 09:00


 4/10/18 08:59  3/13/18 08:46


 


 


 Azithromycin


  (Zithromax)  500 mg  Q24H


 ORAL


   3/12/18 21:00


 3/19/18 20:59  3/13/18 20:46


 


 


 Bisacodyl


  (Dulcolax)  10 mg  DAILYPRN  PRN


 RECTAL


 Constipation  3/12/18 11:45


 4/9/18 11:44   


 


 


 Dextrose


  (Dextrose 50%)    STAT  PRN


 IV


 Hypoglycemia  3/12/18 11:45


 4/9/18 11:44   


 


 


 Docusate Sodium


  (Colace)  100 mg  EVERY 12  HOURS


 ORAL


   3/12/18 21:00


 4/9/18 20:59  3/13/18 08:46


 


 


 Gabapentin


  (Neurontin)  600 mg  DAILY


 ORAL


   3/13/18 09:00


 4/10/18 08:59  3/13/18 08:45


 


 


 Heparin Sodium


  (Porcine)


  (Heparin 5000


 units/ml)  5,000 units  EVERY 12  HOURS


 SUBQ


   3/12/18 21:00


 4/9/18 20:59  3/13/18 20:47


 


 


 Insulin Aspart


  (NovoLOG)    BEFORE MEALS AND  HS


 SUBQ


   3/12/18 12:00


 4/9/18 11:59  3/13/18 20:48


 


 


 Insulin Detemir


  (Levemir)  35 units  Q24H


 SUBQ


   3/13/18 18:00


 4/12/18 17:59  3/13/18 18:03


 


 


 Metformin HCl


  (Glucophage)  500 mg  TWICE A  DAY


 ORAL


   3/12/18 18:00


 4/10/18 17:59  3/13/18 18:01


 


 


 Methylprednisolone


 Sodium Succinate


  (Solu-MEDROL)  60 mg  DAILY


 IVP


   3/13/18 09:00


 4/10/18 12:59  3/13/18 08:47


 


 


 Metoprolol


 Tartrate


  (Lopressor)  25 mg  DAILY


 ORAL


   3/13/18 09:00


 4/10/18 08:59  3/13/18 08:46


 


 


 Mirtazapine


  (Remeron)  30 mg  BEDTIME


 ORAL


   3/12/18 21:00


 4/9/18 20:59  3/13/18 20:46


 


 


 Ondansetron HCl


  (Zofran)  4 mg  Q6H  PRN


 IVP


 Nausea & Vomiting  3/12/18 11:45


 4/9/18 11:44   


 


 


 Pantoprazole


  (Protonix)  40 mg  DAILY


 ORAL


   3/13/18 09:00


 4/10/18 08:59  3/13/18 08:46


 


 


 Polyethylene


 Glycol


  (Miralax)  17 gm  DAILYPRN  PRN


 ORAL


 Constipation  3/12/18 11:45


 4/9/18 11:44   


 


 


 Tamsulosin HCl


  (Flomax)  0.8 mg  BEDTIME


 ORAL


   3/12/18 21:00


 4/9/18 20:59  3/13/18 20:46


 


 


 Vitamin D


  (Vitamin D)  2,000 intlu  DAILY


 ORAL


   3/13/18 09:00


 4/10/18 08:59  3/13/18 08:48


 

















Kierra Garcias MD Mar 13, 2018 21:20

## 2018-03-14 NOTE — GENERAL PROGRESS NOTE
Assessment/Plan


Problem List:  


(1) COPD exacerbation


ICD Codes:  J44.1 - Chronic obstructive pulmonary disease with (acute) 

exacerbation


SNOMED:  035697522


(2) Purulent bronchitis


ICD Codes:  J41.1 - Mucopurulent chronic bronchitis


SNOMED:  43924488


(3) HTN (hypertension)


ICD Codes:  I10 - Essential (primary) hypertension


SNOMED:  98935415


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


ICD Codes:  E11.9 - Type 2 diabetes mellitus without complications; Z79.4 - 

Long term (current) use of insulin


SNOMED:  666221377


(5) Depression


ICD Codes:  F32.9 - Major depressive disorder, single episode, unspecified


SNOMED:  75203289


Status:  stable


Assessment/Plan


Pulmonology consulted


s/p solumedrol 125mg IV in ED


Cont steroids per pulm: decr to solumedrol 60mg IV daily and taper as tolerated


Cont duonebs ATC and PRN


Azithro 500mg daily


Check TTE to ensure no cardiac etiology to SOB


Cont home meds


JOHN


Incr Levemir to 35U daily


Pain control, supportive care, bowel regimen


PT/OT


CM consulted for SNF placement





DVT Prophylaxis:   SCD, HSQ


Code Status:            Full


Hospital Classification Declaration: Based on this initial evaluation, and 

depending on the patient's clinical course, I anticipate that this patient will 

require hospitalization for 1-2 days for COPD exacerbation and close respiratory

/hemodynamic monitoring.





Disposition: Once the patient is stable to leave the hospital, I anticipate the 

patient will likely be discharged to the following environment: SNF





Discussed with patient/family, nursing staff, SW/CM, pulmonology regarding 

clinical status, treatment course, and disposition planning. D/w pulm re 

tapering steroids





Time of note may not reflect time of encounter.





Subjective


Date patient seen:  Mar 13, 2018


Time patient seen:  14:00


ROS Limited/Unobtainable:  No


Constitutional:  Reports: no symptoms


HEENT:  Reports: no symptoms


Respiratory:  Reports: cough, shortness of breath


Gastrointestinal/Abdominal:  Reports: no symptoms


Genitourinary:  Reports: no symptoms


Endocrine:  Reports: no symptoms


Hematologic/Lymphatic:  Reports: no symptoms


Allergies:  


Coded Allergies:  


     SHARK LIVER OIL (Unverified  Allergy, Unknown, 8/6/16)


Uncoded Allergies:  


     SHARK (Allergy, Mild, RASH, 4/11/16)


Subjective


No acute o/n events





Cont to have cough, SOB and wheezing but feeling slightly better today. 

Ambulated and felt more SOB. Denies f/c, n/v, d/c, chest pain, abd pain


Pt states feels weak and not ready to go home. Interested in rehab





Objective





Last 24 Hour Vital Signs








  Date Time  Temp Pulse Resp B/P (MAP) Pulse Ox O2 Delivery O2 Flow Rate FiO2


 


3/14/18 08:14  68  125/80    


 


3/14/18 07:28  85 18  98 Room Air  21


 


3/14/18 07:20  81 18  92 Room Air  21


 


3/14/18 04:00 97.5 69 17 126/85    





 97.5       


 


3/14/18 04:00      Room Air  


 


3/14/18 00:45  73 18  97 Room Air  21


 


3/14/18 00:45        21


 


3/14/18 00:25  74 20  93 Room Air  21


 


3/14/18 00:00      Room Air  


 


3/14/18 00:00 97.5 64 18 106/59 99   





 97.5       


 


3/13/18 20:16 97.9 83 19 138/93 96   





 97.9       


 


3/13/18 19:55  74 18  97 Room Air  21


 


3/13/18 19:55        21


 


3/13/18 19:41  76 20  96 Room Air  21


 


3/13/18 16:00 97.7 87 24 128/73 97   





 97.7       


 


3/13/18 13:29        21


 


3/13/18 13:29  81 18  99 Room Air  21


 


3/13/18 13:29  80 20  95 Room Air  21


 


3/13/18 12:00 97.9 67 26 121/79 92   





 97.9       


 


3/13/18 10:16  84 18  98 Room Air  21


 


3/13/18 10:16        21


 


3/13/18 10:15  82 20  94 Room Air  21

















Intake and Output  


 


 3/13/18 3/14/18





 19:00 07:00


 


Intake Total 1400 ml 


 


Output Total  900 ml


 


Balance 1400 ml -900 ml


 


  


 


Intake Oral 1200 ml 


 


IV Total 200 ml 


 


Output Urine Total  900 ml


 


# Voids 4 


 


# Bowel Movements 2 








Height (Feet):  5


Height (Inches):  11.00


Weight (Pounds):  178


Objective


General: alert, cooperative, no distress, appears stated age


Head: normocephalic, without obvious abnormality, atraumatic


Eyes: conjunctivae/corneas clear. PERRL, EOM's intact


Throat: lips, mucosa, and tongue normal. MMM


Neck: supple, symmetrical, trachea midline, and no JVD


Lungs: +wheezing b/l


Heart: regular rate and rhythm, S1, S2 normal, no murmur, click, rub or gallop


Abdomen: soft, non-tender, non-distended, bowel sounds normal; no masses or 

organomegaly


Extremities: extremities normal, atraumatic, no cyanosis or edema


Pulses: 2+ and symmetric


Skin: skin color, texture, turgor normal; no rashes or lesions


Neurologic: grossly normal, no focal deficits











Elder Cobb M.D. Mar 14, 2018 09:01

## 2018-03-14 NOTE — PULMONOLOGY PROGRESS NOTE
Assessment/Plan


Problems:  


(1) COPD exacerbation


(2) Diabetes


(3) Dyspnea


(4) Purulent bronchitis


Assessment/Plan


impriving





check sputum


chest pt


respiratory treatment


dvt prophylaxis


dc planning





Subjective


ROS Limited/Unobtainable:  No


Allergies:  


Coded Allergies:  


     SHARK LIVER OIL (Unverified  Allergy, Unknown, 8/6/16)


Uncoded Allergies:  


     SHARK (Allergy, Mild, RASH, 4/11/16)





Objective





Last 24 Hour Vital Signs








  Date Time  Temp Pulse Resp B/P (MAP) Pulse Ox O2 Delivery O2 Flow Rate FiO2


 


3/14/18 08:14  68  125/80    


 


3/14/18 08:00 97.3 68 19 125/80 94   





 97.3       


 


3/14/18 07:28  85 18  98 Room Air  21


 


3/14/18 07:20  81 18  92 Room Air  21


 


3/14/18 04:00 97.5 69 17 126/85    





 97.5       


 


3/14/18 04:00      Room Air  


 


3/14/18 00:45  73 18  97 Room Air  21


 


3/14/18 00:45        21


 


3/14/18 00:25  74 20  93 Room Air  21


 


3/14/18 00:00      Room Air  


 


3/14/18 00:00 97.5 64 18 106/59 99   





 97.5       


 


3/13/18 20:16 97.9 83 19 138/93 96   





 97.9       

















Intake and Output  


 


 3/13/18 3/14/18





 19:00 07:00


 


Intake Total 1400 ml 


 


Output Total  900 ml


 


Balance 1400 ml -900 ml


 


  


 


Intake Oral 1200 ml 


 


IV Total 200 ml 


 


Output Urine Total  900 ml


 


# Voids 4 


 


# Bowel Movements 2 








Objective


General Appearance:  cachectic


HEENT:  normocephalic, atraumatic, anicteric


Respiratory/Chest:  chest wall non-tender, rhonchi L>R


Breasts:  no masses


Cardiovascular:  normal peripheral pulses


Abdomen:  normal bowel sounds


Genitourinary:  normal external genitalia











Kierra Garcias MD Mar 14, 2018 19:57

## 2018-03-14 NOTE — CONSULTATION
History of Present Illness


General


Date patient seen:  Mar 12, 2018


Chief Complaint:  Dyspnea/Respdistress





Present Illness


HPI


61y/o male with pmh of HTN, COPD, DM2, depression who presents with SOB and 

cough. Pt has been having worsening symptoms of cough and SOB for the past few 

weeks. the pt is anxious and stressed out. the pt sleeps well on remeron and 

was reluctant to change the medication. the pt has no si/hi. the pt has low 

energy.


Allergies:  


Coded Allergies:  


     SHARK LIVER OIL (Unverified  Allergy, Unknown, 8/6/16)


Uncoded Allergies:  


     SHARK (Allergy, Mild, RASH, 4/11/16)





Medication History


Scheduled


Albuterol Sulfate (Proventil Hfa), 2 PUFFS INH BID, (Reported)


Amantadine Hcl* (Symmetrel*), 100 MG ORAL DAILY, (Reported)


Aspirin* (Aspirin*), 81 MG ORAL DAILY, (Reported)


Azithromycin* (Zithromax*), 500 MG ORAL Q24H


Cholecalciferol (Vitamin D3)* (Vitamin D*), 2,000 UNITS ORAL DAILY, (Reported)


Cyclobenzaprine Hcl (Amrix), 15 MG ORAL DAILY, (Reported)


Dutasteride (Avodart), 0.5 MG ORAL QHS, (Reported)


Formoterol Fumarate (Perforomist), Unknown Dose IH BID, (Reported)


Gabapentin* (Gabapentin*), 600 MG ORAL DAILY, (Reported)


Glycopyrrolate/Formoterol Fum (Bevespi Aerosphere Inhaler), 2 PUFFS IH BID, (

Reported)


Insulin Degludec/Liraglutide (Xultophy 100 Unit-3.6MG/Ml Pen), 40 UNITS SQ QHS, 

(Reported)


Ipratropium/Albuterol Sulfate (DuoNeb 0.5-3(2.5)mg/3ml), 3 ML HHN Q6HRT


Metformin Hcl* (Metformin Hcl*), 500 MG ORAL TWICE A DAY, (Reported)


Metoprolol Tartrate* (Metoprolol Tartrate*), 25 MG ORAL DAILY, (Reported)


Mirtazapine* (Remeron*), 30 MG ORAL BEDTIME, (Reported)


Omeprazole (Omeprazole), 20 MG ORAL DAILY, (Reported)


Roflumilast (Daliresp), 500 MCG PO DAILY, (Reported)


Tamsulosin Hcl (Tamsulosin Hcl*), 0.8 MG ORAL BEDTIME, (Reported)





Scheduled PRN


Insulin Aspart (Novolog Flexpen), SQ TID PRN for SLIDING SCALE, (Reported)


Ipratropium/Albuterol Sulfate (DuoNeb 0.5-3(2.5)mg/3ml), 3 ML HHN Q4H PRN


Linaclotide (Linzess), 145 MCG PO DAILY PRN for Constipation, (Reported)


Oxycodone Hcl* (Roxicodone*), 30 MG ORAL Q8HR PRN for FOR PAIN (SEE PT COMMENTS)

, (Reported)





Discontinued Medications


Albuterol Sulfate (Ventolin Hfa), 2 PUFFS INH EVERY 6 HOURS PRN for Shortness 

of Breath, (Reported)


   Discontinued Reason: Therapy completed


Albuterol Sulfate* (Proair Hfa*), 1 PUFF INH QID, (Reported)


   Discontinued Reason: Therapy completed


Aspirin/Acetaminophen/Caffeine (Excedrin Extra Strength Caplet), 2 TAB PO DAILY 

PRN for For Pain, (Reported)


   Discontinued Reason: Pt stopped taking med


Budesonide/Formoterol Fumarate (Symbicort 160-4.5 Mcg Inhaler), 1 PUFF INH 

TWICE A DAY, (Reported)


   Discontinued Reason: Therapy completed


Clarithromycin (Clarithromycin), 500 MG ORAL TWICE A DAY, (Reported)


   Discontinued Reason: Therapy completed


Escitalopram Oxalate* (Lexapro*), 20 MG ORAL DAILY, (Reported)


   Discontinued Reason: Therapy completed


Furosemide* (Lasix*), 20 MG ORAL DAILY, (Reported)


   Discontinued Reason: Therapy completed


Insulin Degludec (Tresiba Flextouch U-200), 30 UNIT SQ QHS, (Reported)


   Discontinued Reason: Therapy completed


Methylprednisolone (Medrol), 8 MG PO, (Reported)


   Discontinued Reason: Therapy completed


Mometasone Furoate (Nasonex), 2 SPRAYS NASAL DAILY PRN for ALLERGIES, (Reported)


   Discontinued Reason: Therapy completed


Potassium Chloride* (K-Dur*), 10 MEQ ORAL DAILY, (Reported)


   Discontinued Reason: Therapy completed


Prednisone* (Prednisone*), 5 MG ORAL DAILY, (Reported)


   Discontinued Reason: Therapy completed


Suvorexant (Belsomra), 20 MG PO QHS, (Reported)


   Discontinued Reason: Pt stopped taking med


Theophylline Anhydrous (Theophylline), 100 MG PO EVERY 12 HOURS, (Reported)


   Discontinued Reason: Prescription changed


Theophylline Anhydrous (Theophylline Anhydrous), 300 MG PO Q12HR, (Reported)


   Discontinued Reason: MD discontinued med


Tiotropium Bromide* (Spiriva*), 1 PUFF INH DAILY, (Reported)


   Discontinued Reason: Therapy completed





Patient History


Limited by:  medical condition


History Provided By:  Patient, Medical Record, PMD


Healthcare decision maker


N


Resuscitation status


Full Code


Advanced Directive on File


No





Past Medical/Surgical History


Past Medical/Surgical History:  


(1) Arrhythmia


(2) Uncontrolled diabetes mellitus with hyperglycemia


(3) Abnormal laboratory test result


(4) ACS (acute coronary syndrome)


(5) COPD (chronic obstructive pulmonary disease)


(6) Depression


(7) HTN (hypertension)


(8) Insulin dependent type 2 diabetes mellitus


(9) Purulent bronchitis


(10) COPD exacerbation


(11) Diabetes


(12) Dyspnea





Review of Systems


Psychiatric:  Reports: prior hx, anxiety, depressed feelings, emotional problems





Physical Exam


General Appearance:  WD/WN, no apparent distress, alert


Neurologic:  alert, oriented x 3, responsive, depressed affect





Last 24 Hour Vital Signs








  Date Time  Temp Pulse Resp B/P (MAP) Pulse Ox O2 Delivery O2 Flow Rate FiO2


 


3/14/18 08:14  68  125/80    


 


3/14/18 08:00 97.3 68 19 125/80 94   





 97.3       


 


3/14/18 07:28  85 18  98 Room Air  21


 


3/14/18 07:20  81 18  92 Room Air  21


 


3/14/18 04:00 97.5 69 17 126/85    





 97.5       


 


3/14/18 04:00      Room Air  


 


3/14/18 00:45  73 18  97 Room Air  21


 


3/14/18 00:45        21


 


3/14/18 00:25  74 20  93 Room Air  21


 


3/14/18 00:00      Room Air  


 


3/14/18 00:00 97.5 64 18 106/59 99   





 97.5       

















Intake and Output  


 


 3/13/18 3/14/18





 19:00 07:00


 


Intake Total 1400 ml 


 


Output Total  900 ml


 


Balance 1400 ml -900 ml


 


  


 


Intake Oral 1200 ml 


 


IV Total 200 ml 


 


Output Urine Total  900 ml


 


# Voids 4 


 


# Bowel Movements 2 








Height (Feet):  5


Height (Inches):  11.00


Weight (Pounds):  178





Assessment/Plan


Status:  stable, progressing


Assessment/Plan


mdd 


anxiety 





remeron 30 mg qhs











Sam Cannon M.D. Mar 14, 2018 23:59

## 2018-03-14 NOTE — GENERAL PROGRESS NOTE
Assessment/Plan


Status:  stable


Assessment/Plan


mdd 


anxiety 





remeron 30 mg qhs





Subjective


Date patient seen:  Mar 13, 2018


Neurologic/Psychiatric:  Reports: anxiety, depressed, emotional problems


Allergies:  


Coded Allergies:  


     SHARK LIVER OIL (Unverified  Allergy, Unknown, 8/6/16)


Uncoded Allergies:  


     SHARK (Allergy, Mild, RASH, 4/11/16)





Objective





Last 24 Hour Vital Signs








  Date Time  Temp Pulse Resp B/P (MAP) Pulse Ox O2 Delivery O2 Flow Rate FiO2


 


3/14/18 08:14  68  125/80    


 


3/14/18 08:00 97.3 68 19 125/80 94   





 97.3       


 


3/14/18 07:28  85 18  98 Room Air  21


 


3/14/18 07:20  81 18  92 Room Air  21


 


3/14/18 04:00 97.5 69 17 126/85    





 97.5       


 


3/14/18 04:00      Room Air  


 


3/14/18 00:45  73 18  97 Room Air  21


 


3/14/18 00:45        21


 


3/14/18 00:25  74 20  93 Room Air  21


 


3/14/18 00:00      Room Air  


 


3/14/18 00:00 97.5 64 18 106/59 99   





 97.5       

















Intake and Output  


 


 3/13/18 3/14/18





 19:00 07:00


 


Intake Total 1400 ml 


 


Output Total  900 ml


 


Balance 1400 ml -900 ml


 


  


 


Intake Oral 1200 ml 


 


IV Total 200 ml 


 


Output Urine Total  900 ml


 


# Voids 4 


 


# Bowel Movements 2 








Height (Feet):  5


Height (Inches):  11.00


Weight (Pounds):  178


General Appearance:  no apparent distress, alert


Neurologic:  alert, oriented x 3, responsive, depressed affect











Sam Cannon M.D. Mar 14, 2018 23:59

## 2018-03-15 NOTE — GENERAL PROGRESS NOTE
Subjective


Date patient seen:  Mar 14, 2018


Neurologic/Psychiatric:  Reports: anxiety, depressed, emotional problems


Allergies:  


Coded Allergies:  


     SHARK LIVER OIL (Unverified  Allergy, Unknown, 8/6/16)


Uncoded Allergies:  


     SHARK (Allergy, Mild, RASH, 4/11/16)





Objective





Last 24 Hour Vital Signs








  Date Time  Temp Pulse Resp B/P (MAP) Pulse Ox O2 Delivery O2 Flow Rate FiO2


 


3/14/18 08:14  68  125/80    


 


3/14/18 08:00 97.3 68 19 125/80 94   





 97.3       


 


3/14/18 07:28  85 18  98 Room Air  21


 


3/14/18 07:20  81 18  92 Room Air  21


 


3/14/18 04:00 97.5 69 17 126/85    





 97.5       


 


3/14/18 04:00      Room Air  


 


3/14/18 00:45  73 18  97 Room Air  21


 


3/14/18 00:45        21


 


3/14/18 00:25  74 20  93 Room Air  21


 


3/14/18 00:00      Room Air  


 


3/14/18 00:00 97.5 64 18 106/59 99   





 97.5       

















Intake and Output  


 


 3/13/18 3/14/18





 19:00 07:00


 


Intake Total 1400 ml 


 


Output Total  900 ml


 


Balance 1400 ml -900 ml


 


  


 


Intake Oral 1200 ml 


 


IV Total 200 ml 


 


Output Urine Total  900 ml


 


# Voids 4 


 


# Bowel Movements 2 








Height (Feet):  5


Height (Inches):  11.00


Weight (Pounds):  178











Sam Cannon M.D. Mar 14, 2018 23:59

## 2019-11-09 ENCOUNTER — HOSPITAL ENCOUNTER (INPATIENT)
Dept: HOSPITAL 72 - EMR | Age: 64
LOS: 5 days | Discharge: HOME HEALTH | DRG: 315 | End: 2019-11-14
Payer: MEDICARE

## 2019-11-09 VITALS — DIASTOLIC BLOOD PRESSURE: 36 MMHG | SYSTOLIC BLOOD PRESSURE: 67 MMHG

## 2019-11-09 VITALS — HEIGHT: 72 IN | BODY MASS INDEX: 26.48 KG/M2 | WEIGHT: 195.5 LBS

## 2019-11-09 VITALS — SYSTOLIC BLOOD PRESSURE: 105 MMHG | DIASTOLIC BLOOD PRESSURE: 63 MMHG

## 2019-11-09 VITALS — DIASTOLIC BLOOD PRESSURE: 50 MMHG | SYSTOLIC BLOOD PRESSURE: 83 MMHG

## 2019-11-09 VITALS — SYSTOLIC BLOOD PRESSURE: 107 MMHG | DIASTOLIC BLOOD PRESSURE: 66 MMHG

## 2019-11-09 VITALS — DIASTOLIC BLOOD PRESSURE: 57 MMHG | SYSTOLIC BLOOD PRESSURE: 89 MMHG

## 2019-11-09 VITALS — DIASTOLIC BLOOD PRESSURE: 55 MMHG | SYSTOLIC BLOOD PRESSURE: 103 MMHG

## 2019-11-09 VITALS — SYSTOLIC BLOOD PRESSURE: 75 MMHG | DIASTOLIC BLOOD PRESSURE: 44 MMHG

## 2019-11-09 DIAGNOSIS — I48.0: ICD-10-CM

## 2019-11-09 DIAGNOSIS — G20: ICD-10-CM

## 2019-11-09 DIAGNOSIS — I25.10: ICD-10-CM

## 2019-11-09 DIAGNOSIS — Z79.01: ICD-10-CM

## 2019-11-09 DIAGNOSIS — K59.00: ICD-10-CM

## 2019-11-09 DIAGNOSIS — E03.9: ICD-10-CM

## 2019-11-09 DIAGNOSIS — E78.5: ICD-10-CM

## 2019-11-09 DIAGNOSIS — J44.1: ICD-10-CM

## 2019-11-09 DIAGNOSIS — I95.89: Primary | ICD-10-CM

## 2019-11-09 DIAGNOSIS — N18.3: ICD-10-CM

## 2019-11-09 DIAGNOSIS — M62.82: ICD-10-CM

## 2019-11-09 DIAGNOSIS — J98.11: ICD-10-CM

## 2019-11-09 DIAGNOSIS — E86.0: ICD-10-CM

## 2019-11-09 DIAGNOSIS — N17.9: ICD-10-CM

## 2019-11-09 DIAGNOSIS — I12.9: ICD-10-CM

## 2019-11-09 DIAGNOSIS — E44.0: ICD-10-CM

## 2019-11-09 DIAGNOSIS — Z95.5: ICD-10-CM

## 2019-11-09 DIAGNOSIS — Z79.4: ICD-10-CM

## 2019-11-09 DIAGNOSIS — R55: ICD-10-CM

## 2019-11-09 DIAGNOSIS — E11.65: ICD-10-CM

## 2019-11-09 DIAGNOSIS — I45.2: ICD-10-CM

## 2019-11-09 LAB
ADD MANUAL DIFF: NO
ALBUMIN SERPL-MCNC: 2.7 G/DL (ref 3.4–5)
ALBUMIN/GLOB SERPL: 0.6 {RATIO} (ref 1–2.7)
ALP SERPL-CCNC: 61 U/L (ref 46–116)
ALT SERPL-CCNC: 49 U/L (ref 12–78)
ANION GAP SERPL CALC-SCNC: 14 MMOL/L (ref 5–15)
APPEARANCE UR: CLEAR
APTT BLD: 36 SEC (ref 23–33)
APTT PPP: (no result) S
AST SERPL-CCNC: 113 U/L (ref 15–37)
BASOPHILS NFR BLD AUTO: 0.6 % (ref 0–2)
BILIRUB SERPL-MCNC: 0.7 MG/DL (ref 0.2–1)
BUN SERPL-MCNC: 9 MG/DL (ref 7–18)
CALCIUM SERPL-MCNC: 8.2 MG/DL (ref 8.5–10.1)
CHLORIDE SERPL-SCNC: 103 MMOL/L (ref 98–107)
CK SERPL-CCNC: 386 U/L (ref 26–308)
CO2 SERPL-SCNC: 23 MMOL/L (ref 21–32)
CREAT SERPL-MCNC: 2.6 MG/DL (ref 0.55–1.3)
EOSINOPHIL NFR BLD AUTO: 2 % (ref 0–3)
ERYTHROCYTE [DISTWIDTH] IN BLOOD BY AUTOMATED COUNT: 16 % (ref 11.6–14.8)
GLOBULIN SER-MCNC: 4.2 G/DL
GLUCOSE UR STRIP-MCNC: NEGATIVE MG/DL
HCT VFR BLD CALC: 34.6 % (ref 42–52)
HGB BLD-MCNC: 10.7 G/DL (ref 14.2–18)
INR PPP: 1.1 (ref 0.9–1.1)
KETONES UR QL STRIP: NEGATIVE
LEUKOCYTE ESTERASE UR QL STRIP: NEGATIVE
LYMPHOCYTES NFR BLD AUTO: 28.6 % (ref 20–45)
MCV RBC AUTO: 81 FL (ref 80–99)
MONOCYTES NFR BLD AUTO: 12.9 % (ref 1–10)
NEUTROPHILS NFR BLD AUTO: 55.9 % (ref 45–75)
NITRITE UR QL STRIP: NEGATIVE
PH UR STRIP: 7 [PH] (ref 4.5–8)
PLATELET # BLD: 323 K/UL (ref 150–450)
POTASSIUM SERPL-SCNC: 4 MMOL/L (ref 3.5–5.1)
PROT UR QL STRIP: NEGATIVE
RBC # BLD AUTO: 4.27 M/UL (ref 4.7–6.1)
SODIUM SERPL-SCNC: 140 MMOL/L (ref 136–145)
SP GR UR STRIP: 1.01 (ref 1–1.03)
UROBILINOGEN UR-MCNC: NORMAL MG/DL (ref 0–1)
WBC # BLD AUTO: 5.3 K/UL (ref 4.8–10.8)

## 2019-11-09 PROCEDURE — 80053 COMPREHEN METABOLIC PANEL: CPT

## 2019-11-09 PROCEDURE — 94640 AIRWAY INHALATION TREATMENT: CPT

## 2019-11-09 PROCEDURE — 85730 THROMBOPLASTIN TIME PARTIAL: CPT

## 2019-11-09 PROCEDURE — 82746 ASSAY OF FOLIC ACID SERUM: CPT

## 2019-11-09 PROCEDURE — 85025 COMPLETE CBC W/AUTO DIFF WBC: CPT

## 2019-11-09 PROCEDURE — 84481 FREE ASSAY (FT-3): CPT

## 2019-11-09 PROCEDURE — 71045 X-RAY EXAM CHEST 1 VIEW: CPT

## 2019-11-09 PROCEDURE — 83690 ASSAY OF LIPASE: CPT

## 2019-11-09 PROCEDURE — 84439 ASSAY OF FREE THYROXINE: CPT

## 2019-11-09 PROCEDURE — 83036 HEMOGLOBIN GLYCOSYLATED A1C: CPT

## 2019-11-09 PROCEDURE — 80048 BASIC METABOLIC PNL TOTAL CA: CPT

## 2019-11-09 PROCEDURE — 82607 VITAMIN B-12: CPT

## 2019-11-09 PROCEDURE — 82728 ASSAY OF FERRITIN: CPT

## 2019-11-09 PROCEDURE — 84484 ASSAY OF TROPONIN QUANT: CPT

## 2019-11-09 PROCEDURE — 86850 RBC ANTIBODY SCREEN: CPT

## 2019-11-09 PROCEDURE — 80061 LIPID PANEL: CPT

## 2019-11-09 PROCEDURE — 93017 CV STRESS TEST TRACING ONLY: CPT

## 2019-11-09 PROCEDURE — 82550 ASSAY OF CK (CPK): CPT

## 2019-11-09 PROCEDURE — 36415 COLL VENOUS BLD VENIPUNCTURE: CPT

## 2019-11-09 PROCEDURE — 83550 IRON BINDING TEST: CPT

## 2019-11-09 PROCEDURE — 82570 ASSAY OF URINE CREATININE: CPT

## 2019-11-09 PROCEDURE — 87081 CULTURE SCREEN ONLY: CPT

## 2019-11-09 PROCEDURE — 83880 ASSAY OF NATRIURETIC PEPTIDE: CPT

## 2019-11-09 PROCEDURE — 78452 HT MUSCLE IMAGE SPECT MULT: CPT

## 2019-11-09 PROCEDURE — 93306 TTE W/DOPPLER COMPLETE: CPT

## 2019-11-09 PROCEDURE — 82977 ASSAY OF GGT: CPT

## 2019-11-09 PROCEDURE — 84550 ASSAY OF BLOOD/URIC ACID: CPT

## 2019-11-09 PROCEDURE — 86900 BLOOD TYPING SEROLOGIC ABO: CPT

## 2019-11-09 PROCEDURE — 83540 ASSAY OF IRON: CPT

## 2019-11-09 PROCEDURE — 82962 GLUCOSE BLOOD TEST: CPT

## 2019-11-09 PROCEDURE — 82533 TOTAL CORTISOL: CPT

## 2019-11-09 PROCEDURE — 86901 BLOOD TYPING SEROLOGIC RH(D): CPT

## 2019-11-09 PROCEDURE — 80307 DRUG TEST PRSMV CHEM ANLYZR: CPT

## 2019-11-09 PROCEDURE — 84300 ASSAY OF URINE SODIUM: CPT

## 2019-11-09 PROCEDURE — 99291 CRITICAL CARE FIRST HOUR: CPT

## 2019-11-09 PROCEDURE — 80198 ASSAY OF THEOPHYLLINE: CPT

## 2019-11-09 PROCEDURE — 83605 ASSAY OF LACTIC ACID: CPT

## 2019-11-09 PROCEDURE — 84443 ASSAY THYROID STIM HORMONE: CPT

## 2019-11-09 PROCEDURE — 85610 PROTHROMBIN TIME: CPT

## 2019-11-09 PROCEDURE — 96361 HYDRATE IV INFUSION ADD-ON: CPT

## 2019-11-09 PROCEDURE — 96374 THER/PROPH/DIAG INJ IV PUSH: CPT

## 2019-11-09 PROCEDURE — 81003 URINALYSIS AUTO W/O SCOPE: CPT

## 2019-11-09 PROCEDURE — 87040 BLOOD CULTURE FOR BACTERIA: CPT

## 2019-11-09 PROCEDURE — 83735 ASSAY OF MAGNESIUM: CPT

## 2019-11-09 PROCEDURE — 84100 ASSAY OF PHOSPHORUS: CPT

## 2019-11-09 PROCEDURE — 93005 ELECTROCARDIOGRAM TRACING: CPT

## 2019-11-09 RX ADMIN — INSULIN ASPART SCH UNITS: 100 INJECTION, SOLUTION INTRAVENOUS; SUBCUTANEOUS at 20:32

## 2019-11-09 RX ADMIN — CARBIDOPA AND LEVODOPA SCH TAB: 25; 100 TABLET ORAL at 18:10

## 2019-11-09 RX ADMIN — DOCUSATE SODIUM SCH MG: 100 CAPSULE, LIQUID FILLED ORAL at 20:30

## 2019-11-09 RX ADMIN — ALBUTEROL SULFATE SCH PUFF: 108 AEROSOL, METERED RESPIRATORY (INHALATION) at 20:27

## 2019-11-09 NOTE — NUR
ED Nurse Note:





pt brought in by [facility] from bus stop, per EMS report pt had syncopal episode 
witessed by bystanders, when the EMTs arrived pt had second syncopal episode, 
and when the medics arrived pt had third syncopal episode. pt currently AA&ox4 
but noted pt lethargic, noted pt BP systolic in 60s, ERMD notified. noted 18g 
on right hand started by EMS on field, fluids running. pt sinus rhythm on 
cardiac monitor, will cont monitor. pt reports he has history of low blood 
pressure and had syncopal episode before. safety precautions in place, pt 
advised to notify staff if needed assist.

## 2019-11-09 NOTE — EMERGENCY ROOM REPORT
History of Present Illness


General


Source:  Patient, EMS





Present Illness


HPI


Patient recently discharged from the hospital a few days ago.  He was at a 

store and was walking to a bus stop and passed out.  Paramedics were called for 

the syncope.  EMTs had a blood pressure of 90 when he was sitting up.  The 

patient was stood up to transfer to a different gurney and he passed out a 

second time.  He complains of being thirsty.  He denies any pain anyplace in 

his body.  There has been no nausea, vomiting, diarrhea or dysuria.  Paramedics 

gave the patient a bolus of 400 normal saline area his blood pressure was 

somewhat better after this but he still is somewhat altered.  Paramedics 

performed a 12-lead which revealed normal sinus rhythm without injury.





Patient has a history of stents.





History of COPD with prior steroid usage.





Insulin-dependent diabetic.





Patient last admit March this year.  d/c dx:


1) Acute COPD exacerbation ICD Codes:  J44.1 - Chronic obstructive pulmonary 

disease with (acute) exacerbation SNOMED:  399555719


(2) Purulent bronchitis ICD Codes:  J41.1 - Mucopurulent chronic bronchitis 

SNOMED:  52037095


(3) HTN (hypertension) ICD Codes:  I10 - Essential (primary) hypertension SNOMED

:  94607636


(4) Insulin dependent type 2 diabetes mellitus ICD Codes:  E11.9 - Type 2 

diabetes mellitus without complications; Z79.4 - Long term (current) use of 

insulin SNOMED:  467481400


(5) Depression ICD Codes:  F32.9 - Major depressive disorder, single episode, 

unspecified 





No fevers, chills, sore throat, chest pain, palpitations, nausea, vomiting, 

diarrhea, dysuria, abdominal pain, shortness of breath, joint pain, rashes, 

depression, anxiety, headache.


Allergies:  


Coded Allergies:  


     SHARK LIVER OIL (Unverified  Allergy, Unknown, 8/6/16)


Uncoded Allergies:  


     SHARK (Allergy, Mild, RASH, 4/11/16)





Patient History


Past Medical History:  see triage record


Past Surgical History:  PTCA


Social History:  Denies: smoking - Former


Social History Narrative


Lives by himself


Reviewed Nursing Documentation:  PMH: Agreed; PSxH: Agreed





Nursing Documentation-PMH


Hx Cardiac Problems:  Yes


Hx Asthma:  Yes


Hx COPD:  Yes


Hx Diabetes:  Yes


Hx Cancer:  No


Hx Gastrointestinal Problems:  Yes - IBS


Hx Neurological Problems:  No


Hx Cerebrovascular Accident:  No





Review of Systems


All Other Systems:  negative except mentioned in HPI





Physical Exam





Vital Signs








  Date Time  Temp Pulse Resp B/P (MAP) Pulse Ox O2 Delivery O2 Flow Rate FiO2


 


11/9/19 12:11 98.2 72 16 129/82 (98) 99 Room Air  








Sp02 EP Interpretation:  reviewed, normal


General Appearance:  well appearing, no apparent distress, alert - Slightly 

slow to respond, non-toxic, other


Head:  normocephalic


Eyes:  bilateral eye PERRL, bilateral eye other - Arcus bilaterally


ENT:  dry mucus membranes


Neck:  supple


Respiratory:  lungs clear, normal breath sounds


Cardiovascular #1:  regular rate, rhythm, edema


Cardiovascular #2:  2+ radial (L)


Gastrointestinal:  normal inspection, normal bowel sounds, non tender, no mass, 

non-distended


Musculoskeletal:  back normal, normal range of motion, no calf tenderness


Neurologic:  alert, motor strength/tone normal, DTRs symmetric, sensory intact, 

other - Slow speaking in all 4 extremities


Psychiatric:  depressed affect


Skin:  warm/dry, pallor - Plethoric





Procedures


Critical Care Time


Critical Care Time


Total Critical Care Time: 45 min bedside evaluation and treatment excludes 

procedures (EKG).


Reason for critical care: Persistent hypotension, elevated lactic acid, 

hypothyroidism, repeat evaluations


Possible complications: hypotension, hypertension, MI, shock, arrhythmias, 

metabolic acidosis, end organ damage, respiratory failure.


Interventions: Multiple fluid boluses, reevaluations, hydrocortisone


Course: Patient presented with hypotension and altered mentation.  Fluid 

boluses administered and evaluation.  Repeat evaluation with continued 

hypotension.  Continued fluid boluses.  Consideration of myxedema and adrenal 

suppression like to hydrocortisone administration.  Improved blood pressure and 

mentation after fluids and hydrocortisone.


Consultations: nursing staff, EMS


Performed by: Dr. De La Fuente


Tolerated well condition = serious





Medical Decision Making


Diagnostic Impression:  


 Primary Impression:  


 Syncope


 Qualified Codes:  R55 - Syncope and collapse


 Additional Impressions:  


 Transient hypotension


 Hypothyroidism


 Qualified Codes:  E03.9 - Hypothyroidism, unspecified


 Elevated lactic acid level


 Renal failure


 Qualified Codes:  N17.9 - Acute kidney failure, unspecified; N18.9 - Chronic 

kidney disease, unspecified


ER Course


Patient presents after syncopal episode with hypotension.  Differential 

includes acute microinfarction, dehydration, sepsis, medication excess, 

pulmonary embolus amongst others.  Patient will be evaluated EKG, chest x-ray 

and labs including lactate.  The patient will receive IV hydration here.  

Patient is placed on a cardiac monitor.  No evidence of pulmonary embolus based 

on clinical exam.





EKG sinus rhythm with bifascicular block.  Ventricular hypertrophy and biatrial 

enlargement.  Labs remarkable for normal white count and slight anemia.  Renal 

insufficiency.  Elevated TSH.  Chest x-ray no infiltrates.  Initial lactate 

elevated.





Patient presents with hypotension.  There is no evidence of infection at this 

time.  Given patient's clinical presentation infectious etiology unlikely.





Consideration of myxedema and adrenal suppression from prednisone.  

Hydrocortisone administered.





Hypotension improved with fluids and cortisol.  Fully alert, non-focal.  Repeat 

lactic acid improved.





Patient admitted to stepdown unit.





Laboratory Tests








Test


  11/9/19


12:10 11/9/19


13:10 11/9/19


13:55


 


White Blood Count


  5.3 K/UL


(4.8-10.8) 


  


 


 


Red Blood Count


  4.27 M/UL


(4.70-6.10)  L 


  


 


 


Hemoglobin


  10.7 G/DL


(14.2-18.0)  L 


  


 


 


Hematocrit


  34.6 %


(42.0-52.0)  L 


  


 


 


Mean Corpuscular Volume 81 FL (80-99)    


 


Mean Corpuscular Hemoglobin


  25.2 PG


(27.0-31.0)  L 


  


 


 


Mean Corpuscular Hemoglobin


Concent 31.0 G/DL


(32.0-36.0)  L 


  


 


 


Red Cell Distribution Width


  16.0 %


(11.6-14.8)  H 


  


 


 


Platelet Count


  323 K/UL


(150-450) 


  


 


 


Mean Platelet Volume


  5.8 FL


(6.5-10.1)  L 


  


 


 


Neutrophils (%) (Auto)


  55.9 %


(45.0-75.0) 


  


 


 


Lymphocytes (%) (Auto)


  28.6 %


(20.0-45.0) 


  


 


 


Monocytes (%) (Auto)


  12.9 %


(1.0-10.0)  H 


  


 


 


Eosinophils (%) (Auto)


  2.0 %


(0.0-3.0) 


  


 


 


Basophils (%) (Auto)


  0.6 %


(0.0-2.0) 


  


 


 


Prothrombin Time


  11.8 SEC


(9.30-11.50)  H 


  


 


 


Prothrombin Time INR 1.1 (0.9-1.1)    


 


PTT


  36 SEC (23-33)


H 


  


 


 


Sodium Level


  140 MMOL/L


(136-145) 


  


 


 


Potassium Level


  4.0 MMOL/L


(3.5-5.1) 


  


 


 


Chloride Level


  103 MMOL/L


() 


  


 


 


Carbon Dioxide Level


  23 MMOL/L


(21-32) 


  


 


 


Anion Gap


  14 mmol/L


(5-15) 


  


 


 


Blood Urea Nitrogen


  9 mg/dL (7-18)


  


  


 


 


Creatinine


  2.6 MG/DL


(0.55-1.30)  H 


  


 


 


Estimate Glomerular


Filtration Rate 30.2 mL/min


(>60) 


  


 


 


Glucose Level


  202 MG/DL


()  H 


  


 


 


Lactic Acid Level


  3.90 mmol/L


(0.4-2.0)  H 


  1.20 mmol/L


(0.66-2.22)


 


Calcium Level


  8.2 MG/DL


(8.5-10.1)  L 


  


 


 


Total Bilirubin


  0.7 MG/DL


(0.2-1.0) 


  


 


 


Aspartate Amino Transferase


(AST) 113 U/L


(15-37)  H 


  


 


 


Alanine Aminotransferase (ALT)


  49 U/L (12-78)


  


  


 


 


Alkaline Phosphatase


  61 U/L


() 


  


 


 


Total Creatine Kinase


  386 U/L


()  H 


  


 


 


Troponin I


  0.030 ng/mL


(0.000-0.056) 


  


 


 


Pro-B-Type Natriuretic Peptide


  98 pg/mL


(0-125) 


  


 


 


Total Protein


  6.9 G/DL


(6.4-8.2) 


  


 


 


Albumin


  2.7 G/DL


(3.4-5.0)  L 


  


 


 


Globulin 4.2 g/dL    


 


Albumin/Globulin Ratio


  0.6 (1.0-2.7)


L 


  


 


 


Lipase


  60 U/L


()  L 


  


 


 


Thyroid Stimulating Hormone


(TSH) 18.633 uiU/mL


(0.358-3.740) 


  


 


 


Urine Color  Pale yellow   


 


Urine Appearance  Clear   


 


Urine pH  7 (4.5-8.0)   


 


Urine Specific Gravity


  


  1.010


(1.005-1.035) 


 


 


Urine Protein


  


  Negative


(NEGATIVE) 


 


 


Urine Glucose (UA)


  


  Negative


(NEGATIVE) 


 


 


Urine Ketones


  


  Negative


(NEGATIVE) 


 


 


Urine Blood


  


  Negative


(NEGATIVE) 


 


 


Urine Nitrite


  


  Negative


(NEGATIVE) 


 


 


Urine Bilirubin


  


  Negative


(NEGATIVE) 


 


 


Urine Urobilinogen


  


  Normal MG/DL


(0.0-1.0) 


 


 


Urine Leukocyte Esterase


  


  Negative


(NEGATIVE) 


 


 


Urine Opiates Screen


  


  Negative


(NEGATIVE) 


 


 


Urine Barbiturates Screen


  


  Negative


(NEGATIVE) 


 


 


Phencyclidine (PCP) Screen


  


  Negative


(NEGATIVE) 


 


 


Urine Amphetamines Screen


  


  Negative


(NEGATIVE) 


 


 


Urine Benzodiazepines Screen


  


  Negative


(NEGATIVE) 


 


 


Urine Cocaine Screen


  


  Negative


(NEGATIVE) 


 


 


Urine Marijuana (THC) Screen


  


  Negative


(NEGATIVE) 


 








EKG Diagnostic Results


Rate:  normal


Rhythm:  NSR


ST Segments:  no acute changes - Bifascicular block





Rhythm Strip Diag. Results


EP Interpretation:  yes


Rhythm:  NSR, no PVC's, no ectopy





Chest X-Ray Diagnostic Results


Chest X-Ray Diagnostic Results :  


   Chest X-Ray Ordered:  Yes


   # of Views/Limited/Complete:  1 View


   Indication:  Other


   EP Interpretation:  Yes


   Interpretation:  no consolidation, no effusion, no pneumothorax, other - 

Increasee hilar marks right


   Impression:  Other


   Electronically Signed by:  Electronically signed by Robert De La Fuente MD





Last Vital Signs








  Date Time  Temp Pulse Resp B/P (MAP) Pulse Ox O2 Delivery O2 Flow Rate FiO2


 


11/10/19 00:00 97.9 74 20 104/60 (75) 98   


 


11/10/19 00:00      Room Air  


 


11/9/19 20:38        21


 


11/9/19 13:40       2.0 








Status:  improved


Disposition:  ADMITTED AS INPATIENT


Condition:  Serious











Robert De La Fuente MD Nov 9, 2019 12:16

## 2019-11-09 NOTE — NUR
ED Nurse Note:



blood specimen obtained per ERMD order and fluid started per ERMD order, pt c/o 
burning pain on right hand, iv d/c and dressing applied. 20g iv on left ac 
started, pt tolerated well w/ 1 attempt. site intact and patent. warm blanket 
provided for comfort. noted pt o2sat down to 86% on RA, pt has hx COPD, ermd 
notified and started o2 via nc 2L/min. will cont monitor. o2 sat w/oxygen noted 
99%. verified w/ ERMD, pt can drink, pt given water and tolerated well. pt 
advised to notify staff if needed assist. safety cautions in place.

## 2019-11-09 NOTE — HISTORY AND PHYSICAL
History of Present Illness


General


Date patient seen:  Nov 9, 2019


Time patient seen:  17:00


Reason for Hospitalization:  Syncope





Present Illness


HPI


65 y/o AA male who was recently discharged from [facility] a few 

days ago. He reports having had EGD and colonoscopy during that hospital stay 

and denies any GI bleed or other findings. He has a history of CAD, s/p PCI 

with stent placement last January 2019 at [facility] by Dr. Trev Bird. He has 

IDDM on Lantus and Novolog sliding scale along with metformin, HLD on statin, 

He is on anticoagulation with Apixiban 5 mg BID. 





  He was at a store today and was walking to a bus stop and passed out. Per ED 

report, Paramedics were called for the syncope.  EMTs had a blood pressure of 

90 when he was sitting up.  The patient was stood up to transfer to a different 

gurney and he passed out a second time.  He complains of being thirsty.  He 

denies any pain anyplace in his body.  Is been no nausea, vomiting, diarrhea or 

dysuria.  Paramedics gave the patient a bolus of 400 normal saline area his 

blood pressure was somewhat better after this but he still is somewhat altered.

  Paramedics performed a 12-lead which revealed normal sinus rhythm without 

injury





Currently, he is in the Hospital floor and denies prior renal failure in the 

past. Today, his creatinine was 2.6. denies any changes in urination and 

reports being told at [facility] not to drink too much water ( ? history of 

heart failure ), he has not had a bowel movement x 3 days, denies fever, chills

, cough, shortness of breath. He has been taking his BP medication which 

includes Lisinopril, Metoprolol. 





Denies prior thyroid abnormalities. His TSH noted to be 18.6,  and EKG 

with NSR bifascicular block, RBBB and LAFB, no ST changes or conduction 

defects. 





At baseline he uses a cane and denies recent falls.


Allergies:  


Coded Allergies:  


     SHARK LIVER OIL (Unverified  Allergy, Unknown, 8/6/16)


Uncoded Allergies:  


     SHARK (Allergy, Mild, RASH, 4/11/16)





Medication History


Scheduled


Albuterol Sulfate (Proventil Hfa), 2 PUFFS INH BID, (Reported)


Albuterol Sulfate (Ventolin Hfa), 2 PUFFS INH EVERY 4 HOURS, (Reported)


Amantadine Hcl* (Symmetrel*), 100 MG ORAL DAILY, (Reported)


Apixaban (Eliquis), 5 MG PO TWICE A DAY, (Reported)


Aspirin* (Aspirin*), 81 MG ORAL DAILY, (Reported)


Atorvastatin Calcium* (Lipitor*), 80 MG ORAL BEDTIME, (Reported)


Carbidopa/Levodopa  Mg* (Sinemet  Mg Tablet*), 1 TAB ORAL THREE 

TIMES A DAY, (Reported)


Clotrimazole* (Lotrimin*), 1 APPLIC TOPIC TWICE A DAY, (Reported)


Dutasteride (Avodart), 0.5 MG ORAL QHS, (Reported)


Ergocalciferol (Vitamin D2) (Vitamin D2), 50,000 UNIT PO EVERY WEEK, (Reported)


Ferrous Sulfate* (Ferrous Sulfate*), 325 MG ORAL DAILY, (Reported)


Fluticasone Propionate (Flonase Allergy Relief), 9.9 ML NS DAILY, (Reported)


Formoterol Fumarate (Perforomist), Unknown Dose IH BID, (Reported)


Gabapentin* (Gabapentin*), 600 MG ORAL THREE TIMES A DAY, (Reported)


Hydrocortisone 1% Oint (Hydrocortisone 1% Oint*), 28 GM TP TWICE A DAY, (

Reported)


Insulin Degludec/Liraglutide (Xultophy 100 Unit-3.6MG/Ml Pen), 40 UNITS SQ QHS, 

(Reported)


Insulin Glargine (Lantus), 15 SUBQ DAILY, (Reported)


Ipratropium/Albuterol Sulfate (DuoNeb 0.5-3(2.5)mg/3ml), 3 ML HHN Q6HRT


Lisinopril* (Lisinopril*), 2.5 MG ORAL DAILY, (Reported)


Losartan Potassium* (Losartan Potassium*), 25 MG ORAL DAILY, (Reported)


Metformin Hcl* (Metformin Hcl*), 500 MG ORAL TWICE A DAY, (Reported)


Metoprolol Tartrate* (Metoprolol Tartrate*), 25 MG ORAL DAILY, (Reported)


Mirabegron (Myrbetriq), 25 MG PO DAILY, (Reported)


Mirtazapine* (Remeron*), 45 MG ORAL BEDTIME, (Reported)


Montelukast Sodium (Singulair), 5 MG ORAL DAILY, (Reported)


Omeprazole (Omeprazole), 40 MG ORAL BEDTIME, (Reported)


Ramelteon (Rozerem), 8 MG PO BEDTIME, (Reported)


Roflumilast (Daliresp), 500 MCG PO DAILY, (Reported)


Roflumilast (Daliresp), 250 MCG PO TWICE A DAY, (Reported)


Tamsulosin Hcl (Tamsulosin Hcl*), 0.8 MG ORAL BEDTIME, (Reported)


Theophylline Anhydrous (Theophylline Anhydrous), 300 MG PO TWICE A DAY, (

Reported)


Triamcinolone Acetonide (Triamcinolone Acetonide 0.1% Oint*), 0 TP TWICE A DAY, 

(Reported)





Scheduled PRN


Insulin Aspart (Novolog Flexpen), SQ TID PRN for SLIDING SCALE, (Reported)


Ipratropium/Albuterol Sulfate (DuoNeb 0.5-3(2.5)mg/3ml), 3 ML HHN Q4H PRN


Linaclotide (Linzess), 145 MCG PO DAILY PRN for Constipation, (Reported)


Nitroglycerin 0.4MG table* (Nitroglycerin*), 0.4 MG SL .Q5MIN X 3 DOSES PRN for 

CHEST PAIN, (Reported)


Oxycodone Hcl* (Roxicodone*), 30 MG ORAL Q8HR PRN for FOR PAIN (SEE PT COMMENTS)

, (Reported)





Discontinued Medications


Azithromycin* (Zithromax*), 500 MG ORAL Q24H


   Discontinued Reason: Pt stopped taking med


Cholecalciferol (Vitamin D3)* (Vitamin D*), 2,000 UNITS ORAL DAILY, (Reported)


   Discontinued Reason: Pt stopped taking med


Cyclobenzaprine Hcl (Amrix), 15 MG ORAL DAILY, (Reported)


   Discontinued Reason: Pt stopped taking med


Doxycycline Hyclate (Doxycycline Hyclate), 100 MG PO TWICE A DAY, (Reported)


   Discontinued Reason: Pt stopped taking med


Glycopyrrolate/Formoterol Fum (Bevespi Aerosphere Inhaler), 2 PUFFS IH BID, (

Reported)


   Discontinued Reason: Pt stopped taking med





Patient History


Healthcare decision maker


N


Resuscitation status


Full Code


Advanced Directive on File








Family History


Family History:  


Patient reports no known family medical history.





Review of Systems


Cardiovascular:  Reports: see HPI, syncope


All Other Systems:  negative except mentioned in HPI





Physical Exam


General Appearance:  WD/WN, no apparent distress


Lines, tubes and drains:  peripheral


HEENT:  normocephalic, atraumatic


Neck:  non-tender, normal alignment


Respiratory/Chest:  lungs clear, normal breath sounds


Cardiovascular/Chest:  normal rate, regular rhythm, no JVD


Abdomen:  normal bowel sounds, soft


Extremities:  normal range of motion


Skin Exam:  normal pigmentation


Neurologic:  CNs II-XII grossly normal, alert, oriented x 3 - s





Last 24 Hour Vital Signs








  Date Time  Temp Pulse Resp B/P (MAP) Pulse Ox O2 Delivery O2 Flow Rate FiO2


 


11/9/19 16:00 97.7 79 18 103/55 (71) 93   


 


11/9/19 15:59      Room Air  


 


11/9/19 15:47  79      


 


11/9/19 14:54  79 18 107/66 100 Room Air  


 


11/9/19 14:25  79 20 89/57 96 Room Air  


 


11/9/19 13:40  78 18 83/50 100 Nasal Cannula 2.0 


 


11/9/19 12:55 98.2 77 16 75/44 99 Nasal Cannula 2.0 


 


11/9/19 12:11 97.8 78 16 67/36 93 Room Air  


 


11/9/19 12:11 98.2 72 16 129/82 (98) 99 Room Air  











Laboratory Tests








Test


  11/9/19


12:10 11/9/19


13:10 11/9/19


13:55 11/9/19


18:45


 


White Blood Count


  5.3 K/UL


(4.8-10.8) 


  


  


 


 


Red Blood Count


  4.27 M/UL


(4.70-6.10)  L 


  


  


 


 


Hemoglobin


  10.7 G/DL


(14.2-18.0)  L 


  


  


 


 


Hematocrit


  34.6 %


(42.0-52.0)  L 


  


  


 


 


Mean Corpuscular Volume 81 FL (80-99)     


 


Mean Corpuscular Hemoglobin


  25.2 PG


(27.0-31.0)  L 


  


  


 


 


Mean Corpuscular Hemoglobin


Concent 31.0 G/DL


(32.0-36.0)  L 


  


  


 


 


Red Cell Distribution Width


  16.0 %


(11.6-14.8)  H 


  


  


 


 


Platelet Count


  323 K/UL


(150-450) 


  


  


 


 


Mean Platelet Volume


  5.8 FL


(6.5-10.1)  L 


  


  


 


 


Neutrophils (%) (Auto)


  55.9 %


(45.0-75.0) 


  


  


 


 


Lymphocytes (%) (Auto)


  28.6 %


(20.0-45.0) 


  


  


 


 


Monocytes (%) (Auto)


  12.9 %


(1.0-10.0)  H 


  


  


 


 


Eosinophils (%) (Auto)


  2.0 %


(0.0-3.0) 


  


  


 


 


Basophils (%) (Auto)


  0.6 %


(0.0-2.0) 


  


  


 


 


Prothrombin Time


  11.8 SEC


(9.30-11.50)  H 


  


  


 


 


Prothromb Time International


Ratio 1.1 (0.9-1.1)  


  


  


  


 


 


Activated Partial


Thromboplast Time 36 SEC (23-33)


H 


  


  


 


 


Sodium Level


  140 MMOL/L


(136-145) 


  


  


 


 


Potassium Level


  4.0 MMOL/L


(3.5-5.1) 


  


  


 


 


Chloride Level


  103 MMOL/L


() 


  


  


 


 


Carbon Dioxide Level


  23 MMOL/L


(21-32) 


  


  


 


 


Anion Gap


  14 mmol/L


(5-15) 


  


  


 


 


Blood Urea Nitrogen


  9 mg/dL (7-18)


  


  


  


 


 


Creatinine


  2.6 MG/DL


(0.55-1.30)  H 


  


  


 


 


Estimat Glomerular Filtration


Rate 30.2 mL/min


(>60) 


  


  


 


 


Glucose Level


  202 MG/DL


()  H 


  


  


 


 


Lactic Acid Level


  3.90 mmol/L


(0.4-2.0)  H 


  1.20 mmol/L


(0.66-2.22) 


 


 


Calcium Level


  8.2 MG/DL


(8.5-10.1)  L 


  


  


 


 


Total Bilirubin


  0.7 MG/DL


(0.2-1.0) 


  


  


 


 


Aspartate Amino Transf


(AST/SGOT) 113 U/L


(15-37)  H 


  


  


 


 


Alanine Aminotransferase


(ALT/SGPT) 49 U/L (12-78)


  


  


  


 


 


Alkaline Phosphatase


  61 U/L


() 


  


  


 


 


Total Creatine Kinase


  386 U/L


()  H 


  


  


 


 


Troponin I


  0.030 ng/mL


(0.000-0.056) 


  


  Pending  


 


 


Pro-B-Type Natriuretic Peptide


  98 pg/mL


(0-125) 


  


  


 


 


Total Protein


  6.9 G/DL


(6.4-8.2) 


  


  


 


 


Albumin


  2.7 G/DL


(3.4-5.0)  L 


  


  


 


 


Globulin 4.2 g/dL     


 


Albumin/Globulin Ratio


  0.6 (1.0-2.7)


L 


  


  


 


 


Lipase


  60 U/L


()  L 


  


  


 


 


Thyroid Stimulating Hormone


(TSH) 18.633 uiU/mL


(0.358-3.740) 


  


  


 


 


Urine Color  Pale yellow    


 


Urine Appearance  Clear    


 


Urine pH  7 (4.5-8.0)    


 


Urine Specific Gravity


  


  1.010


(1.005-1.035) 


  


 


 


Urine Protein


  


  Negative


(NEGATIVE) 


  


 


 


Urine Glucose (UA)


  


  Negative


(NEGATIVE) 


  


 


 


Urine Ketones


  


  Negative


(NEGATIVE) 


  


 


 


Urine Blood


  


  Negative


(NEGATIVE) 


  


 


 


Urine Nitrite


  


  Negative


(NEGATIVE) 


  


 


 


Urine Bilirubin


  


  Negative


(NEGATIVE) 


  


 


 


Urine Urobilinogen


  


  Normal MG/DL


(0.0-1.0) 


  


 


 


Urine Leukocyte Esterase


  


  Negative


(NEGATIVE) 


  


 


 


Urine Opiates Screen


  


  Negative


(NEGATIVE) 


  


 


 


Urine Barbiturates Screen


  


  Negative


(NEGATIVE) 


  


 


 


Phencyclidine (PCP) Screen


  


  Negative


(NEGATIVE) 


  


 


 


Urine Amphetamines Screen


  


  Negative


(NEGATIVE) 


  


 


 


Urine Benzodiazepines Screen


  


  Negative


(NEGATIVE) 


  


 


 


Urine Cocaine Screen


  


  Negative


(NEGATIVE) 


  


 


 


Urine Marijuana (THC) Screen


  


  Negative


(NEGATIVE) 


  


 








Height (Feet):  6


Height (Inches):  0.00


Weight (Pounds):  193


Medications





Current Medications








 Medications


  (Trade)  Dose


 Ordered  Sig/Amina


 Route


 PRN Reason  Start Time


 Stop Time Status Last Admin


Dose Admin


 


 Albuterol Sulfate


  (Proventil MDI)  2 puff  Q12HR


 INH


   11/9/19 21:00


 12/9/19 20:59   


 


 


 Albuterol/


 Ipratropium


  (Albuterol/


 Ipratropium)  3 ml  Q4H  PRN


 HHN


 Shortness of Breath  11/9/19 16:45


 11/14/19 16:44   


 


 


 Amantadine HCl


  (Symmetrel)  100 mg  DAILY


 ORAL


   11/10/19 09:00


 12/10/19 08:59   


 


 


 Aspirin


  (ASA)  81 mg  DAILY


 ORAL


   11/10/19 09:00


 12/10/19 08:59   


 


 


 Atorvastatin


 Calcium


  (Lipitor)  80 mg  BEDTIME


 ORAL


   11/9/19 21:00


 12/9/19 20:59   


 


 


 Carbidopa/Levodopa


  (Sinemet 25/100)  1 tab  THREE TIMES A  DAY


 ORAL


   11/9/19 18:00


 12/9/19 17:59  11/9/19 18:10


 


 


 Dextrose


  (Dextrose 50%)  25 ml  Q30M  PRN


 IV


 Hypoglycemia  11/9/19 16:45


 12/9/19 16:44   


 


 


 Dextrose


  (Dextrose 50%)  25 ml  Q30M  PRN


 IV


 Hypoglycemia  11/9/19 18:45


 12/9/19 18:44   


 


 


 Dextrose


  (Dextrose 50%)  50 ml  Q30M  PRN


 IV


 Hypoglycemia  11/9/19 16:45


 12/9/19 16:44   


 


 


 Dextrose


  (Dextrose 50%)  50 ml  Q30M  PRN


 IV


 Hypoglycemia  11/9/19 18:45


 12/9/19 18:44   


 


 


 Docusate Sodium


  (Colace)  100 mg  EVERY 12  HOURS


 ORAL


   11/9/19 21:00


 12/9/19 20:59   


 


 


 Famotidine


  (Pepcid)  40 mg  DAILY


 ORAL


   11/10/19 09:00


 12/10/19 08:59   


 


 


 Gabapentin


  (Neurontin)  600 mg  THREE TIMES A  DAY


 ORAL


   11/9/19 18:00


 12/9/19 17:59  11/9/19 18:10


 


 


 Insulin Aspart


  (NovoLOG)    BEFORE MEALS AND  HS


 SUBQ


   11/9/19 21:00


 12/9/19 20:59   


 


 


 Lorazepam


  (Ativan 2mg/ml


 1ml)  0.5 mg  Q4H  PRN


 IV


 For Anxiety  11/9/19 16:45


 11/16/19 16:44   


 


 


 Morphine Sulfate


  (Morphine


 Sulfate)  2 mg  Q4H  PRN


 IVP


 Moderate Pain (Pain Scale 4-6)  11/9/19 16:45


 11/16/19 16:44   


 


 


 Nitroglycerin


  (Ntg)  0.4 mg  Q5MIN X 3 DOSES PRN


 SL


 CHEST PAIN  11/9/19 16:45


 12/9/19 16:44   


 


 


 Ondansetron HCl


  (Zofran)  4 mg  Q6H  PRN


 IVP


 Nausea & Vomiting  11/9/19 16:45


 12/9/19 16:44   


 


 


 Polyethylene


 Glycol


  (Miralax)  17 gm  DAILYPRN  PRN


 ORAL


 Constipation  11/9/19 16:45


 12/9/19 16:44   


 


 


 Sodium Chloride  1,000 ml @ 


 75 mls/hr  [id number]


 IV


   11/9/19 17:36


 12/9/19 17:35  11/9/19 18:10


 


 


 Tamsulosin HCl


  (Flomax)  0.8 mg  BEDTIME


 ORAL


   11/9/19 21:00


 12/9/19 20:59   


 











Assessment/Plan


Status:  stable


Assessment/Plan:


65 y/o male admitted to the Hospital with:





# Witnessed syncope


- Etiology include dehydration vs orthostatic hypotension vs underlying cardiac 

etiology


- Admit to [facility] with cardiac monitor


- Orthostatic vital signs q 6 hr


- TTE for EF and structural review.


- Troponin follow up tonight


- Cardiology consult called to Dr. Hook


- If any new syncope occurs, repeat EKG and cadiac enzyme follow up.





# Acute vs acute on chronic renal failure


- Etiology include pre renal due to dehydration vs medication induced.


- STOP Metformin and Lisinopril pending response in renal function


- Renal US, urine Cr and Na ordered


- Renal consultation requested with Dr. Mayo


- Trend renal function. Currently GRF is 30 ( CKD 3/4)


- IVF at 75 cc hr with 1/2 NS and will discuss with nephrology next steps in 

therapy


- Avoid nephrotoxins





# Abnormal CXR with LL lobe atelectasis vs infiltrate


- No signs of acute pneumonia without cough, sputum, leukocytosis or fever. 


- Monitor and HOLD antibiotics for now


- Lactate was elevated on admission 3.9 and repeat was 2 - most likely due to 

syncopal event.


- Add Procalcitonin which is very sensitive for pneumonia





# Abnormal TSH level


- T3 and T4 ordered


- Will need to repeat TSH in the non acute setting.





# Bifascicular EKG block with NSR LAFB and RBBB


- Cardiology consulted


- Telemetry


- Trend K and Mag level in am





# Mild Rhabdomyolysis


- 


- Trend CK in am post IVF





# Recent admission at [facility] with EGD and Colonoscopy done (unknown 

results)


- Try to obtain records with 





# Generalized weakness


- PT evaluation in AM





# HTN


- Hold lisinopril and metoprolol for now due to syncope and low blood pressure. 


- Monitor and discuss with cardiology next steps. 





# Constipation


_ Bowel regimen





#CAD


- S/p PCI and stents in 1/19 by Dr. Bird. 


- Apixiban therapy in place, likely for ANJALI


- Follow up cardiology consult





# HLD


- Statin





# DVT ppx


- Adjusted Apixiban to 2.5 mg BID renal dose





# GI ppx


- PPI





# FULL CODE











Mary James MD Nov 9, 2019 19:23

## 2019-11-09 NOTE — NUR
CASE MANAGEMENT: INITIAL REVIEW 



65 YO M BIBA FROM HOME 



CC: SYNCOPE 



PMHx: ASTHMA. COPD. DM. IBS. 



SI:SYNCOPE 

T 98.2 HR 72 RR 16 B/P 129/82 SATS 99% ON RA 

LABS: CR 2.6  LACTIC ACID 3.9  TOTAL  TSH 18.633 



IS: NS BOLUS X2 



***PATIENT ADMITTED TO SDU 11/8/2019 @ 1320****



DCP: PATIENT TO BE DISCHARGED TO HOME ONCE MEDICALLY CLEARED. 



PLAN OF CARE: 

IV HYDRATION 

-------------------------------------------------------------------------------

Addendum: 11/09/19 at 1930 by Lesly Kerns CM

-------------------------------------------------------------------------------

INTERQUAL MET

## 2019-11-09 NOTE — NUR
NURSE NOTES:

Patient is noted with $500 on persons. Per patient declines to put monies in safe and states 
would like to keep on persons. Monies counted at bedside with transferring nurse and charge 
nurse. Noted. Monies placed in patient's wallet and placed in belonging's bag. Belongings 
bag placed in drawer at bedside. Noted.

## 2019-11-09 NOTE — NUR
NURSE NOTES:

Received report from MARYA Leblanc. Patient arrived to unit in stable condition. No s/sx of SOB, 
breathing is even and unlabored. Bed is in lowest position, brakes engaged. Call light is 
kept within easy reach. Will continue to monitor patient.

## 2019-11-09 NOTE — NUR
NURSE NOTES:

Received patient from Ozzy MALHOTRA. Patient is awake, alert and oriented x4. Patient is resting 
comfortably in bed, able to make needs known. Receiving oxygen on room air, no signs of 
respiratory distress. IV site is left AC 20g receiving 1/2 NS at 75cc/hr. Bed is locked, 
placed in lowest position, side rails up x2, call light within reach, head of bed elevated.

## 2019-11-09 NOTE — NUR
NURSE NOTES:

Dr. James covering for Dr. Baird at nurse station. Seen and examined patient at bedside. 
Dr. James entered orders. Noted.

## 2019-11-09 NOTE — DIAGNOSTIC IMAGING REPORT
EXAM:

  XR Chest, 1 View

 

CLINICAL HISTORY:

  SYNCOPE

 

TECHNIQUE:

  Frontal view of the chest.

 

COMPARISON:

  Chest x-ray report 3 24 17

 

FINDINGS:

  Lungs:  Left lung base atelectasis mild opacity may be an infiltrate.

  Pleural space:  Unremarkable.  No pneumothorax.

  Heart:  Unremarkable.  No cardiomegaly.

  Mediastinum:  Unremarkable.

  Bones joints:  Thoracolumbar scoliosis.  Lower cervical spine hardware.

  

 

IMPRESSION:     

  Left lung base atelectasis mild opacity may be an infiltrate.

## 2019-11-10 VITALS — DIASTOLIC BLOOD PRESSURE: 64 MMHG | SYSTOLIC BLOOD PRESSURE: 102 MMHG

## 2019-11-10 VITALS — SYSTOLIC BLOOD PRESSURE: 111 MMHG | DIASTOLIC BLOOD PRESSURE: 73 MMHG

## 2019-11-10 VITALS — DIASTOLIC BLOOD PRESSURE: 65 MMHG | SYSTOLIC BLOOD PRESSURE: 114 MMHG

## 2019-11-10 VITALS — SYSTOLIC BLOOD PRESSURE: 118 MMHG | DIASTOLIC BLOOD PRESSURE: 70 MMHG

## 2019-11-10 VITALS — DIASTOLIC BLOOD PRESSURE: 79 MMHG | SYSTOLIC BLOOD PRESSURE: 111 MMHG

## 2019-11-10 VITALS — SYSTOLIC BLOOD PRESSURE: 104 MMHG | DIASTOLIC BLOOD PRESSURE: 60 MMHG

## 2019-11-10 LAB
ADD MANUAL DIFF: NO
ALBUMIN SERPL-MCNC: 2.7 G/DL (ref 3.4–5)
ALBUMIN/GLOB SERPL: 0.6 {RATIO} (ref 1–2.7)
ALP SERPL-CCNC: 64 U/L (ref 46–116)
ALT SERPL-CCNC: 66 U/L (ref 12–78)
ANION GAP SERPL CALC-SCNC: 15 MMOL/L (ref 5–15)
AST SERPL-CCNC: 91 U/L (ref 15–37)
BASOPHILS NFR BLD AUTO: 0.3 % (ref 0–2)
BILIRUB SERPL-MCNC: 0.7 MG/DL (ref 0.2–1)
BUN SERPL-MCNC: 9 MG/DL (ref 7–18)
CALCIUM SERPL-MCNC: 8 MG/DL (ref 8.5–10.1)
CHLORIDE SERPL-SCNC: 102 MMOL/L (ref 98–107)
CHOLEST SERPL-MCNC: 90 MG/DL (ref ?–200)
CK SERPL-CCNC: 357 U/L (ref 26–308)
CO2 SERPL-SCNC: 22 MMOL/L (ref 21–32)
CREAT SERPL-MCNC: 1.3 MG/DL (ref 0.55–1.3)
EOSINOPHIL NFR BLD AUTO: 0 % (ref 0–3)
ERYTHROCYTE [DISTWIDTH] IN BLOOD BY AUTOMATED COUNT: 16.3 % (ref 11.6–14.8)
GAMMA GLUTAMYL TRANSPEPTIDASE: 245 U/L (ref 5–85)
GLOBULIN SER-MCNC: 4.6 G/DL
HCT VFR BLD CALC: 32.2 % (ref 42–52)
HDLC SERPL-MCNC: 28 MG/DL (ref 40–60)
HGB BLD-MCNC: 10.4 G/DL (ref 14.2–18)
LYMPHOCYTES NFR BLD AUTO: 19.3 % (ref 20–45)
MCV RBC AUTO: 80 FL (ref 80–99)
MONOCYTES NFR BLD AUTO: 6.2 % (ref 1–10)
NEUTROPHILS NFR BLD AUTO: 74.2 % (ref 45–75)
PHOSPHATE SERPL-MCNC: 3.8 MG/DL (ref 2.5–4.9)
PLATELET # BLD: 321 K/UL (ref 150–450)
POTASSIUM SERPL-SCNC: 4.5 MMOL/L (ref 3.5–5.1)
RBC # BLD AUTO: 4.02 M/UL (ref 4.7–6.1)
SODIUM SERPL-SCNC: 139 MMOL/L (ref 136–145)
TRIGL SERPL-MCNC: 75 MG/DL (ref 30–150)
WBC # BLD AUTO: 3.9 K/UL (ref 4.8–10.8)

## 2019-11-10 RX ADMIN — APIXABAN SCH MG: 5 TABLET, FILM COATED ORAL at 17:02

## 2019-11-10 RX ADMIN — CARBIDOPA AND LEVODOPA SCH TAB: 25; 100 TABLET ORAL at 12:08

## 2019-11-10 RX ADMIN — ATORVASTATIN CALCIUM SCH MG: 80 TABLET, FILM COATED ORAL at 21:22

## 2019-11-10 RX ADMIN — INSULIN ASPART SCH UNITS: 100 INJECTION, SOLUTION INTRAVENOUS; SUBCUTANEOUS at 16:59

## 2019-11-10 RX ADMIN — DOCUSATE SODIUM SCH MG: 100 CAPSULE, LIQUID FILLED ORAL at 09:49

## 2019-11-10 RX ADMIN — APIXABAN SCH MG: 5 TABLET, FILM COATED ORAL at 10:54

## 2019-11-10 RX ADMIN — CARBIDOPA AND LEVODOPA SCH TAB: 25; 100 TABLET ORAL at 16:59

## 2019-11-10 RX ADMIN — CARBIDOPA AND LEVODOPA SCH TAB: 25; 100 TABLET ORAL at 09:49

## 2019-11-10 RX ADMIN — ALBUTEROL SULFATE SCH PUFF: 108 AEROSOL, METERED RESPIRATORY (INHALATION) at 09:33

## 2019-11-10 RX ADMIN — INSULIN ASPART SCH UNITS: 100 INJECTION, SOLUTION INTRAVENOUS; SUBCUTANEOUS at 06:31

## 2019-11-10 RX ADMIN — INSULIN ASPART SCH UNITS: 100 INJECTION, SOLUTION INTRAVENOUS; SUBCUTANEOUS at 12:03

## 2019-11-10 RX ADMIN — TAMSULOSIN HYDROCHLORIDE SCH MG: 0.4 CAPSULE ORAL at 21:25

## 2019-11-10 RX ADMIN — ALBUTEROL SULFATE SCH PUFF: 108 AEROSOL, METERED RESPIRATORY (INHALATION) at 21:34

## 2019-11-10 RX ADMIN — INSULIN ASPART SCH UNITS: 100 INJECTION, SOLUTION INTRAVENOUS; SUBCUTANEOUS at 21:42

## 2019-11-10 RX ADMIN — THEOPHYLLINE ANHYDROUS SCH MG: 100 CAPSULE, EXTENDED RELEASE ORAL at 21:24

## 2019-11-10 RX ADMIN — DOCUSATE SODIUM SCH MG: 100 CAPSULE, LIQUID FILLED ORAL at 21:25

## 2019-11-10 NOTE — NUR
PT Note

PT chely completed, tx initiated. Patient has muscle weakness and decreased postural 
stability with postural sway throughout gait training. Patient's BP was 137/85 in sitting; 
after gait training, BP was 119/77 in supine position. RN was notified. Patient denies any 
pain. Patient needs PT to increase his muscle strength and balance to improve his safety in 
mobility and gait. 

-------------------------------------------------------------------------------

Addendum: 11/10/19 at 1532 by ZULLY LISA PT

-------------------------------------------------------------------------------

Amended: Links added.

## 2019-11-10 NOTE — NUR
NURSE NOTES:

Assessed patient's orthostatic blood pressure. Laying supine /75, Sitting 123/59, 
Standing 101/68. Will continue to monitor patient.

## 2019-11-10 NOTE — NUR
NURSE NOTES:

Received patient from MARYA Staples , patient in stable condition, AOx4, laying in bed, resting, 
able to ambulate, denies pain at this time, Iv on left AC g20, asymptomatic, intact, patent, 
bed low&locked, call light within reach, side rails upx2, will continue to monitor and 
reassess.

## 2019-11-10 NOTE — NUR
NURSE NOTES:

Received patient's cortisol AM result from [facility], Dr. Baird made aware, awaiting 
call back

## 2019-11-10 NOTE — CONSULTATION
DATE OF CONSULTATION:  11/10/2019

CARDIOLOGY CONSULTATION



CONSULTING PHYSICIAN:  Júnior Hook M.D.



REFERRING PHYSICIAN:  John Baird M.D.



REASON FOR CONSULTATION:  Management of hypertension and syncope in a

patient with coronary artery disease and atrial fibrillation.



HISTORY OF PRESENT ILLNESS:  The patient is a 64-year-old 

gentleman with history of hypertension, diabetes, and coronary artery

disease with history of stent placement at [facility] by  _____ in

January 2019.  The patient also has paroxysmal atrial fibrillation and has

been on anticoagulation by Eliquis 5 mg b.i.d.  The patient was recently

discharged from [facility] a few days ago after he had EGD and

colonoscopy.  He was at the store today and was walking to bus stop and

then he passed out.  Paramedics were called, and the blood pressure was 90

when he was sitting.  The patient stood up and transferred to the [facility]

and then he passed out the second time. The patient received 400 mL of

normal saline and blood pressure was in the 90s.  The 12-lead EKG by

paramedics shows sinus rhythm with no acute injury pattern.  The patient's

creatinine was noted to be 2.6 and was admitted, and Cardiology

consultation was obtained for further evaluation.  EKG shows sinus rhythm

with right bundle-branch block and left anterior fascicular block.



REVIEW OF SYSTEMS:  Review of systems was negative other than what was

mentioned in the history of present illness.



PAST MEDICAL HISTORY:  As mentioned above.



FAMILY HISTORY:  Noncontributory.



SOCIAL HISTORY:  He lives at home.  Does not smoke or drink

alcohol.



PHYSICAL EXAMINATION:

VITAL SIGNS:  Blood pressure is 111/73, pulse is 95, respirations 18, and

he is afebrile.

HEAD AND NECK:  Shows no JVD.

LUNGS:  Clear.

CARDIOVASCULAR:  Shows regular S1 and S2 with no gallop or murmur.

ABDOMEN:  Soft.

EXTREMITIES:  No pitting edema.



LABORATORY AND DIAGNOSTIC DATA:  His EKG showed sinus rhythm with

bifascicular block, right bundle-branch block, and left anterior

fascicular block.  His echocardiogram showed ejection fraction of 60%.

His laboratories show white count 3.9, hematocrit 10.4, hematocrit of 32,

and platelet count of 321,000.  Sodium 139, potassium 4.5, BUN of 9, and

creatinine 1.3, and glucose of 187.  His CK was 357.  His troponin was

negative x2.



ASSESSMENT AND PLAN:

1. Syncopal episode.  The patient is already ruled out for myocardial

infarction with serial cardiac enzymes.  His urine is not clear.  The

electrolytes are not suggestive of dehydration.  We will watch the patient

on telemetry.  The patient may need electrophysiologic study for further

evaluation of his syncopal episode.

2. Coronary artery disease with history of prior stent in January 2019.

The patient currently denies any chest pain.  Continue aspirin and

Lipitor.  The patient is off antihypertensive agents as blood pressure was

in the 90s initially and received IV fluid.

3. Paroxysmal atrial fibrillation, currently in sinus rhythm on Eliquis

5 mg b.i.d.  Off any antiarrhythmics.

4. Bifascicular block with right bundle-branch block and left anterior

fascicular block.

5. Parkinson's, on Sinemet.

6. Hyperlipidemia.

7. Mild rhabdomyolysis.  The patient received IV fluid.

8. Renal failure.  Creatinine was 2.6, but followup creatinine has been

1.3 and normal.



Thank you very much, Dr. Baird, for allowing me to participate in the

care of this patient.  Please do not hesitate to contact me for any

questions regarding my evaluation.



Sincerely,









  ______________________________________________

  Júnior Hook M.D.





DR:  STANLEY

D:  11/10/2019 16:34

T:  11/10/2019 21:41

JOB#:  [id number]

CC:

## 2019-11-10 NOTE — NUR
NURSE NOTES:

Received report from MARYA Dasilva. Patient is resting in bed, in stable condition. No s/sx of 
SOB, breathing is even and unlabored. Patient is on room air. Patient is noted high risk of 
falls, patient is on yellow gown and yellow nonskid socks. Informed patient that bed alarm 
must be on at all times for safety. Patient refused, explained to patient safety measures of 
bed alarm, patient is alert and oriented x4, patient verbalized understanding and continued 
to refuse bed alarm. Respected patient rights. Informed charge nurse. Bed is in lowest 
position, brakes engaged. Call light is kept within easy reach. Will continue to monitor 
patient.

## 2019-11-10 NOTE — NUR
NURSE NOTES:

Received report from MARYA Preciado @ [facility]. The patient got safely transferred to [facility] @ 
203-1. The patient denies of acute distress or shortness of breath. The patient's bed in the 
lowest position, call light in reach, and fall and aspiration precaution reinforced. Two IV 
sites are intact and patent. The patient's belongings checked including $500 cash in wallet 
checked with two nurses and signed by two nurse. The patient is scheduled for stress test 
tomorrow and contraindication for Lien scan discussed with Dr. Hook and MARYA Preciado. The 
patient's skin is intact. In short, the patient got safely transferred to 203-1 in stable 
condition without acute distress or shortness of breath. Will continue plan of care.

## 2019-11-10 NOTE — NUR
TRANSFER TO FLOOR:

Patient transferred to [facility] -1 , per Dr. James.   Report given to MARYA Staples. 
Belongings and medications given MARYA Staples Patient's monies counted at bedside for a total 
of $500. Family informed of transfer.

## 2019-11-10 NOTE — CARDIAC ELECTROPHYSIOLOGY PN
Subjective


Subjective


[id number]





Objective





Last 24 Hour Vital Signs








  Date Time  Temp Pulse Resp B/P (MAP) Pulse Ox O2 Delivery O2 Flow Rate FiO2


 


11/10/19 13:02  87      


 


11/10/19 12:00  87      





  91      





  98      


 


11/10/19 12:00      Room Air  


 


11/10/19 12:00 98.0 88 20 111/73 (86) 93   


 


11/10/19 09:33  87 18  98 Room Air  21


 


11/10/19 09:33  87 16  98 Room Air  21


 


11/10/19 09:20  87      


 


11/10/19 08:00 97.2 87 20 114/65 (81) 96   


 


11/10/19 08:00      Room Air  


 


11/10/19 04:00 98.2 80 20 111/79 (90) 98   


 


11/10/19 04:00      Room Air  


 


11/10/19 03:52  76      


 


11/10/19 00:00  76      


 


11/10/19 00:00 97.9 74 20 104/60 (75) 98   


 


11/10/19 00:00      Room Air  


 


11/9/19 20:38  85 18  98 Room Air  21


 


11/9/19 20:34  83 16  98 Room Air  21


 


11/9/19 20:00      Room Air  


 


11/9/19 20:00 98.2 84 20 105/63 (77) 95   


 


11/9/19 19:16  88      

















Intake and Output  


 


 11/9/19 11/10/19





 18:59 06:59


 


Intake Total 1060 ml 362.5 ml


 


Output Total 800 ml 


 


Balance 260 ml 362.5 ml


 


  


 


Intake Oral 60 ml 


 


IV Total 1000 ml 362.5 ml


 


Output Urine Total 800 ml 


 


# Bowel Movements  2











Laboratory Tests








Test


  11/9/19


18:45 11/9/19


19:00 11/10/19


04:00


 


Troponin I


  0.013 ng/mL


(0.000-0.056) 


  


 


 


Urine Random Sodium


  


  98 mmol/L


() 


 


 


Urine Creatinine


  


  24.6 MG/DL


(30.0-125.0)  L 


 


 


White Blood Count


  


  


  3.9 K/UL


(4.8-10.8)  L


 


Red Blood Count


  


  


  4.02 M/UL


(4.70-6.10)  L


 


Hemoglobin


  


  


  10.4 G/DL


(14.2-18.0)  L


 


Hematocrit


  


  


  32.2 %


(42.0-52.0)  L


 


Mean Corpuscular Volume   80 FL (80-99)  


 


Mean Corpuscular Hemoglobin


  


  


  25.8 PG


(27.0-31.0)  L


 


Mean Corpuscular Hemoglobin


Concent 


  


  32.3 G/DL


(32.0-36.0)


 


Red Cell Distribution Width


  


  


  16.3 %


(11.6-14.8)  H


 


Platelet Count


  


  


  321 K/UL


(150-450)


 


Mean Platelet Volume


  


  


  5.6 FL


(6.5-10.1)  L


 


Neutrophils (%) (Auto)


  


  


  74.2 %


(45.0-75.0)


 


Lymphocytes (%) (Auto)


  


  


  19.3 %


(20.0-45.0)  L


 


Monocytes (%) (Auto)


  


  


  6.2 %


(1.0-10.0)


 


Eosinophils (%) (Auto)


  


  


  0.0 %


(0.0-3.0)


 


Basophils (%) (Auto)


  


  


  0.3 %


(0.0-2.0)


 


Sodium Level


  


  


  139 MMOL/L


(136-145)


 


Potassium Level


  


  


  4.5 MMOL/L


(3.5-5.1)


 


Chloride Level


  


  


  102 MMOL/L


()


 


Carbon Dioxide Level


  


  


  22 MMOL/L


(21-32)


 


Anion Gap


  


  


  15 mmol/L


(5-15)


 


Blood Urea Nitrogen


  


  


  9 mg/dL (7-18)


 


 


Creatinine


  


  


  1.3 MG/DL


(0.55-1.30)


 


Estimat Glomerular Filtration


Rate 


  


  > 60 mL/min


(>60)


 


Glucose Level


  


  


  187 MG/DL


()  H


 


Hemoglobin A1c


  


  


  11.8 %


(4.3-6.0)  H


 


Uric Acid


  


  


  6.3 MG/DL


(2.6-7.2)


 


Calcium Level


  


  


  8.0 MG/DL


(8.5-10.1)  L


 


Phosphorus Level


  


  


  3.8 MG/DL


(2.5-4.9)


 


Magnesium Level


  


  


  1.5 MG/DL


(1.8-2.4)  L


 


Total Bilirubin


  


  


  0.7 MG/DL


(0.2-1.0)


 


Gamma Glutamyl Transpeptidase


  


  


  245 U/L (5-85)


H


 


Aspartate Amino Transf


(AST/SGOT) 


  


  91 U/L (15-37)


H


 


Alanine Aminotransferase


(ALT/SGPT) 


  


  66 U/L (12-78)


 


 


Alkaline Phosphatase


  


  


  64 U/L


()


 


Total Creatine Kinase


  


  


  357 U/L


()  H


 


Total Protein


  


  


  7.3 G/DL


(6.4-8.2)


 


Albumin


  


  


  2.7 G/DL


(3.4-5.0)  L


 


Globulin   4.6 g/dL  


 


Albumin/Globulin Ratio


  


  


  0.6 (1.0-2.7)


L


 


Triglycerides Level


  


  


  75 MG/DL


()


 


Cholesterol Level


  


  


  90 MG/DL (<


200)


 


LDL Cholesterol


  


  


  49 mg/dL


(<100)


 


HDL Cholesterol


  


  


  28 MG/DL


(40-60)  L


 


Cholesterol/HDL Ratio


  


  


  3.2 (3.3-4.4)


L


 


Free Thyroxine


  


  


  1.20 NG/DL


(0.76-1.46)


 


Triiodothyonine (T3)   Pending  


 


Free Triiodothyronine


  


  


  2.2 pg/mL


(2.3-4.2)  L


 


Cortisol AM Sample   Pending  

















Júinor Hook MD Nov 10, 2019 16:32

## 2019-11-10 NOTE — NUR
HAND-OFF: 

Report given to MARYA Viera. The patient is resting on the bed without acute distress or 
shortness of breath. The patient's bed in the lowest position, call light in reach, and fall 
and aspiration precaution reinforced. IV sites are intact and patent. Education given 
regarding NPO since midnight for stress test. Endorsed plan of care.

## 2019-11-10 NOTE — NUR
NURSE NOTES:

Noted Dr. Hook ordered lexiscan cardiac stress test for patient. Patient is noted with 
history of Asthma and has taken albuterol inhaler this morning. Dr. Hook acknowledged and 
stated orders is entered for either lexiscan or dobutamine and will defer to cardiology 
department for which test to do. Noted. Will continue to monitor patient.

## 2019-11-10 NOTE — CONSULTATION
Consult Note


Consult Note





asked to eval by Dr James for elevated serum Cr








Patient recently discharged from the hospital a few days ago.  He was at a 

store and was walking to a bus stop and passed out.  Paramedics were called for 

the syncope.  EMTs had a blood pressure of 90 when he was sitting up.  The 

patient was stood up to transfer to a different gurney and he passed out a 

second time.  He complains of being thirsty.  He denies any pain anyplace in 

his body.  Is been no nausea, vomiting, diarrhea or dysuria.  Paramedics gave 

the patient a bolus of 400 normal saline area his blood pressure was somewhat 

better after this but he still is somewhat altered.  Paramedics performed a 12-

lead which revealed normal sinus rhythm without injury.





Patient has a history of stents.


Insulin-dependent diabetic.








Coded Allergies:  


     SHARK LIVER OIL (Unverified  Allergy, Unknown, 8/6/16)


Uncoded Allergies:  


     SHARK (Allergy, Mild, RASH, 4/11/16)





Hx Cardiac Problems:  Yes


Hx Asthma:  Yes


Hx COPD:  Yes


Hx Diabetes:  Yes


Hx Gastrointestinal Problems:  Yes - IBS








examined data reviewed


discussed with Dr James


Assessment/Plan





Acute renal failure- Cr 2.6 on presentation now normalized


Syncope


HypoThyroidism


mild Anemia


Parkinsons


DM OOC


COPD 


Depression


HTN


CAD


LL Atx vs Infilt











monitor renal parameters


slow hydrate


anemia briggs


optimize cardiac and pulmonary status


per orders / Consultants











Cm Farias MD Nov 10, 2019 14:46

## 2019-11-10 NOTE — GENERAL PROGRESS NOTE
Assessment/Plan


Status:  stable


Assessment/Plan:


63 y/o male admitted to the Hospital with:





# Witnessed syncope


- Etiology include dehydration vs orthostatic hypotension vs underlying cardiac 

etiology


- Admitted to [facility] with cardiac monitor


- Orthostatic vital signs q 6 hr are stable


- TTE for EF and structural review. PENDING report.


- Troponin follow up negative. no need to check more.


- Cardiology consult called to Dr. Hook


- If any new syncope occurs, repeat EKG and cadiac enzyme follow up noted.





# Acute vs acute on chronic renal failure


- Etiology include pre renal due to dehydration vs medication induced.


- STOP Metformin and Lisinopril pending response in renal function


- Renal US, urine Cr and Na ordered


- Renal consultation requested with Dr. Mayo


- Trend renal function. Currently GRF is 30 ( CKD 3/4) and NOW improved after 

IVF, Creatinine is 1.3 today. 


- IVF at 75 cc hr with 1/2 NS and will discuss with nephrology next steps in 

therapy


- Avoid nephrotoxins





# Abnormal CXR with LL lobe atelectasis vs infiltrate


- No signs of acute pneumonia without cough, sputum, leukocytosis or fever. 


- Monitor and HOLD antibiotics for now


- Lactate was elevated on admission 3.9 and repeat was 2 - most likely due to 

syncopal event.


- Add Procalcitonin which is very sensitive for pneumonia





# Abnormal TSH level


- T3 and T4 ordered and stable. Suspect stress response.


- Will need to repeat TSH in the non acute setting.





# Bifascicular EKG block with NSR LAFB and RBBB


- Cardiology consulted


- Telemetry


- Trend K and Mag level in am





# Mild Rhabdomyolysis


-  on admission


- Trend CK in am post IVF





# Recent admission at [facility] with EGD and Colonoscopy done (unknown 

results)


- Try to obtain records with 





# Generalized weakness


- PT evaluation in AM





# HTN


- Hold lisinopril and metoprolol for now due to syncope and low blood pressure. 


- Monitor and discuss with cardiology next steps. 





# Constipation


_ Bowel regimen





#CAD


- S/p PCI and stents in 1/19 by Dr. Bird. 


- Apixiban therapy in place, likely for ANJALI


- Follow up cardiology consult





# HLD


- Statin





# DVT ppx


- Apixiban therapy. will continue at 5 mg BID since renal function improved 

after IVF





# GI ppx


- PPI





# FULL CODE





Subjective


ROS Limited/Unobtainable:  No


Allergies:  


Coded Allergies:  


     SHARK LIVER OIL (Unverified  Allergy, Unknown, 8/6/16)


Uncoded Allergies:  


     SHARK (Allergy, Mild, RASH, 4/11/16)


Subjective


Feels better today, able to ambulate to the restroom, denies dizzyness, chest 

pain, shortness of breath, nausea or emesis. Good urine output.





Objective





Last 24 Hour Vital Signs








  Date Time  Temp Pulse Resp B/P (MAP) Pulse Ox O2 Delivery O2 Flow Rate FiO2


 


11/10/19 09:33  87 18  98 Room Air  21


 


11/10/19 09:33  87 16  98 Room Air  21


 


11/10/19 09:20  87      


 


11/10/19 08:00 97.2 87 20 114/65 (81) 96   


 


11/10/19 08:00      Room Air  


 


11/10/19 04:00 98.2 80 20 111/79 (90) 98   


 


11/10/19 04:00      Room Air  


 


11/10/19 03:52  76      


 


11/10/19 00:00  76      


 


11/10/19 00:00 97.9 74 20 104/60 (75) 98   


 


11/10/19 00:00      Room Air  


 


11/9/19 20:38  85 18  98 Room Air  21


 


11/9/19 20:34  83 16  98 Room Air  21


 


11/9/19 20:00      Room Air  


 


11/9/19 20:00 98.2 84 20 105/63 (77) 95   


 


11/9/19 19:16  88      


 


11/9/19 16:00 97.7 79 18 103/55 (71) 93   


 


11/9/19 15:59      Room Air  


 


11/9/19 15:47  79      


 


11/9/19 14:54  79 18 107/66 100 Room Air  


 


11/9/19 14:25  79 20 89/57 96 Room Air  


 


11/9/19 13:40  78 18 83/50 100 Nasal Cannula 2.0 


 


11/9/19 12:55 98.2 77 16 75/44 99 Nasal Cannula 2.0 


 


11/9/19 12:11 97.8 78 16 67/36 93 Room Air  


 


11/9/19 12:11 98.2 72 16 129/82 (98) 99 Room Air  

















Intake and Output  


 


 11/9/19 11/10/19





 19:00 07:00


 


Intake Total 1122.5 ml 300 ml


 


Output Total 800 ml 


 


Balance 322.5 ml 300 ml


 


  


 


Intake Oral 60 ml 


 


IV Total 1062.5 ml 300 ml


 


Output Urine Total 800 ml 


 


# Bowel Movements  2








Laboratory Tests


11/9/19 12:10: 


White Blood Count 5.3, Red Blood Count 4.27L, Hemoglobin 10.7L, Hematocrit 34.6L

, Mean Corpuscular Volume 81, Mean Corpuscular Hemoglobin 25.2L, Mean 

Corpuscular Hemoglobin Concent 31.0L, Red Cell Distribution Width 16.0H, 

Platelet Count 323, Mean Platelet Volume 5.8L, Neutrophils (%) (Auto) 55.9, 

Lymphocytes (%) (Auto) 28.6, Monocytes (%) (Auto) 12.9H, Eosinophils (%) (Auto) 

2.0, Basophils (%) (Auto) 0.6, Prothrombin Time 11.8H, Prothromb Time 

International Ratio 1.1, Activated Partial Thromboplast Time 36H, Sodium Level 

140, Potassium Level 4.0, Chloride Level 103, Carbon Dioxide Level 23, Anion 

Gap 14, Blood Urea Nitrogen 9, Creatinine 2.6H, Estimat Glomerular Filtration 

Rate 30.2, Glucose Level 202H, Lactic Acid Level 3.90H, Calcium Level 8.2L, 

Total Bilirubin 0.7, Aspartate Amino Transf (AST/SGOT) 113H, Alanine 

Aminotransferase (ALT/SGPT) 49, Alkaline Phosphatase 61, Total Creatine Kinase 

386H, Troponin I 0.030, Pro-B-Type Natriuretic Peptide 98, Total Protein 6.9, 

Albumin 2.7L, Globulin 4.2, Albumin/Globulin Ratio 0.6L, Lipase 60L, Thyroid 

Stimulating Hormone (TSH) 18.633H


11/9/19 13:10: 


Urine Color Pale yellow, Urine Appearance Clear, Urine pH 7, Urine Specific 

Gravity 1.010, Urine Protein Negative, Urine Glucose (UA) Negative, Urine 

Ketones Negative, Urine Blood Negative, Urine Nitrite Negative, Urine Bilirubin 

Negative, Urine Urobilinogen Normal, Urine Leukocyte Esterase Negative, Urine 

Opiates Screen Negative, Urine Barbiturates Screen Negative, Phencyclidine (PCP

) Screen Negative, Urine Amphetamines Screen Negative, Urine Benzodiazepines 

Screen Negative, Urine Cocaine Screen Negative, Urine Marijuana (THC) Screen 

Negative


11/9/19 13:55: Lactic Acid Level 1.20


11/9/19 18:45: Troponin I 0.013


11/9/19 19:00: 


Urine Random Sodium 98, Urine Creatinine 24.6L


11/10/19 04:00: 


White Blood Count 3.9L, Red Blood Count 4.02L, Hemoglobin 10.4L, Hematocrit 

32.2L, Mean Corpuscular Volume 80, Mean Corpuscular Hemoglobin 25.8L, Mean 

Corpuscular Hemoglobin Concent 32.3, Red Cell Distribution Width 16.3H, 

Platelet Count 321, Mean Platelet Volume 5.6L, Neutrophils (%) (Auto) 74.2, 

Lymphocytes (%) (Auto) 19.3L, Monocytes (%) (Auto) 6.2, Eosinophils (%) (Auto) 

0.0, Basophils (%) (Auto) 0.3, Sodium Level 139, Potassium Level 4.5, Chloride 

Level 102, Carbon Dioxide Level 22, Anion Gap 15, Blood Urea Nitrogen 9, 

Creatinine 1.3, Estimat Glomerular Filtration Rate > 60, Glucose Level 187H, 

Hemoglobin A1c 11.8H, Uric Acid 6.3, Calcium Level 8.0L, Phosphorus Level 3.8, 

Magnesium Level 1.5L, Total Bilirubin 0.7, Gamma Glutamyl Transpeptidase 245H, 

Aspartate Amino Transf (AST/SGOT) 91H, Alanine Aminotransferase (ALT/SGPT) 66, 

Alkaline Phosphatase 64, Total Creatine Kinase 357H, Total Protein 7.3, Albumin 

2.7L, Globulin 4.6, Albumin/Globulin Ratio 0.6L, Triglycerides Level 75, 

Cholesterol Level 90, LDL Cholesterol 49, HDL Cholesterol 28L, Cholesterol/HDL 

Ratio 3.2L, Free Thyroxine 1.20, Triiodothyonine (T3) [Pending], Free 

Triiodothyronine 2.2L, Cortisol AM Sample [Pending]


Height (Feet):  6


Height (Inches):  0.00


Weight (Pounds):  193


General Appearance:  WD/WN, no apparent distress


Neck:  non-tender


Cardiovascular:  normal rate, regular rhythm


Respiratory/Chest:  lungs clear, normal breath sounds, no respiratory distress


Abdomen:  non tender


Neurologic:  CNs II-XII grossly normal











Mary James MD Nov 10, 2019 10:47

## 2019-11-10 NOTE — CARDIOLOGY REPORT
--------------- APPROVED REPORT --------------





EKG Measurement

Heart [id number]

MO [id number]

[id number]

[id number]

[id number]





Normal sinus rhythm

Right bundle branch block

Left anterior fascicular block

*** Bifascicular block ***

Moderate voltage criteria for LVH, may be normal variant

Cannot rule out Septal infarct, age undetermined

Abnormal ECG

## 2019-11-10 NOTE — NUR
NURSE NOTES:

Dr. James seen and examined patient at bedside. Inquired MD if okay to transfer to 
telemetry, Dr. James acknowledged and ordered to transfer to Telemetry. Order entered, 
noted, and carried out. Also per Dr. James will enter Eliquis and theophylline orders 
himself noted. Will continue to monitor patient.

## 2019-11-11 VITALS — SYSTOLIC BLOOD PRESSURE: 114 MMHG | DIASTOLIC BLOOD PRESSURE: 71 MMHG

## 2019-11-11 VITALS — SYSTOLIC BLOOD PRESSURE: 107 MMHG | DIASTOLIC BLOOD PRESSURE: 57 MMHG

## 2019-11-11 VITALS — DIASTOLIC BLOOD PRESSURE: 67 MMHG | SYSTOLIC BLOOD PRESSURE: 109 MMHG

## 2019-11-11 VITALS — SYSTOLIC BLOOD PRESSURE: 108 MMHG | DIASTOLIC BLOOD PRESSURE: 75 MMHG

## 2019-11-11 VITALS — DIASTOLIC BLOOD PRESSURE: 52 MMHG | SYSTOLIC BLOOD PRESSURE: 94 MMHG

## 2019-11-11 VITALS — DIASTOLIC BLOOD PRESSURE: 68 MMHG | SYSTOLIC BLOOD PRESSURE: 110 MMHG

## 2019-11-11 VITALS — SYSTOLIC BLOOD PRESSURE: 109 MMHG | DIASTOLIC BLOOD PRESSURE: 78 MMHG

## 2019-11-11 LAB
% IRON SATURATION: 40 % (ref 15–50)
ALBUMIN SERPL-MCNC: 2.7 G/DL (ref 3.4–5)
ALBUMIN/GLOB SERPL: 0.7 {RATIO} (ref 1–2.7)
ALP SERPL-CCNC: 65 U/L (ref 46–116)
ALT SERPL-CCNC: 70 U/L (ref 12–78)
ANION GAP SERPL CALC-SCNC: 10 MMOL/L (ref 5–15)
AST SERPL-CCNC: 87 U/L (ref 15–37)
BILIRUB SERPL-MCNC: 0.3 MG/DL (ref 0.2–1)
BUN SERPL-MCNC: 8 MG/DL (ref 7–18)
CALCIUM SERPL-MCNC: 7.6 MG/DL (ref 8.5–10.1)
CHLORIDE SERPL-SCNC: 109 MMOL/L (ref 98–107)
CO2 SERPL-SCNC: 28 MMOL/L (ref 21–32)
CREAT SERPL-MCNC: 1.2 MG/DL (ref 0.55–1.3)
FERRITIN SERPL-MCNC: 270 NG/ML (ref 8–388)
GLOBULIN SER-MCNC: 3.9 G/DL
IRON SERPL-MCNC: 122 UG/DL (ref 50–175)
PHOSPHATE SERPL-MCNC: 2.5 MG/DL (ref 2.5–4.9)
POTASSIUM SERPL-SCNC: 4.2 MMOL/L (ref 3.5–5.1)
SODIUM SERPL-SCNC: 146 MMOL/L (ref 136–145)
TIBC SERPL-MCNC: 306 UG/DL (ref 250–450)
UNSATURATED IRON BINDING: 184 UG/DL (ref 112–346)

## 2019-11-11 RX ADMIN — AMANTADINE HYDROCHLORIDE SCH MG: 100 CAPSULE ORAL at 09:12

## 2019-11-11 RX ADMIN — CARBIDOPA AND LEVODOPA SCH TAB: 25; 100 TABLET ORAL at 12:14

## 2019-11-11 RX ADMIN — ALBUTEROL SULFATE SCH PUFF: 108 AEROSOL, METERED RESPIRATORY (INHALATION) at 21:52

## 2019-11-11 RX ADMIN — DOCUSATE SODIUM SCH MG: 100 CAPSULE, LIQUID FILLED ORAL at 21:00

## 2019-11-11 RX ADMIN — INSULIN ASPART SCH UNITS: 100 INJECTION, SOLUTION INTRAVENOUS; SUBCUTANEOUS at 21:34

## 2019-11-11 RX ADMIN — APIXABAN SCH MG: 5 TABLET, FILM COATED ORAL at 09:11

## 2019-11-11 RX ADMIN — DOCUSATE SODIUM SCH MG: 100 CAPSULE, LIQUID FILLED ORAL at 09:10

## 2019-11-11 RX ADMIN — INSULIN ASPART SCH UNITS: 100 INJECTION, SOLUTION INTRAVENOUS; SUBCUTANEOUS at 11:30

## 2019-11-11 RX ADMIN — CARBIDOPA AND LEVODOPA SCH TAB: 25; 100 TABLET ORAL at 09:12

## 2019-11-11 RX ADMIN — CARBIDOPA AND LEVODOPA SCH TAB: 25; 100 TABLET ORAL at 17:10

## 2019-11-11 RX ADMIN — THEOPHYLLINE ANHYDROUS SCH MG: 100 CAPSULE, EXTENDED RELEASE ORAL at 21:33

## 2019-11-11 RX ADMIN — TAMSULOSIN HYDROCHLORIDE SCH MG: 0.4 CAPSULE ORAL at 21:33

## 2019-11-11 RX ADMIN — APIXABAN SCH MG: 5 TABLET, FILM COATED ORAL at 17:10

## 2019-11-11 RX ADMIN — NATEGLINIDE SCH MG: 60 TABLET ORAL at 17:10

## 2019-11-11 RX ADMIN — INSULIN ASPART SCH UNITS: 100 INJECTION, SOLUTION INTRAVENOUS; SUBCUTANEOUS at 17:08

## 2019-11-11 RX ADMIN — ATORVASTATIN CALCIUM SCH MG: 80 TABLET, FILM COATED ORAL at 21:33

## 2019-11-11 RX ADMIN — INSULIN ASPART SCH UNITS: 100 INJECTION, SOLUTION INTRAVENOUS; SUBCUTANEOUS at 06:47

## 2019-11-11 RX ADMIN — THEOPHYLLINE ANHYDROUS SCH MG: 100 CAPSULE, EXTENDED RELEASE ORAL at 09:00

## 2019-11-11 RX ADMIN — ALBUTEROL SULFATE SCH PUFF: 108 AEROSOL, METERED RESPIRATORY (INHALATION) at 09:00

## 2019-11-11 RX ADMIN — ASPIRIN 81 MG SCH MG: 81 TABLET ORAL at 09:10

## 2019-11-11 RX ADMIN — NATEGLINIDE SCH MG: 60 TABLET ORAL at 12:14

## 2019-11-11 NOTE — CARDIOLOGY REPORT
--------------- APPROVED REPORT --------------





EXAM: Two-dimensional and M-mode echocardiogram with Doppler and color Doppler.



INDICATION

Syncope 



M-Mode DIMENSIONS 

IVSd1.3 (0.7-1.1cm)Left Atrium (MM)2.7 (1.6-4.0cm)

LVDd4.7 (3.5-5.6cm)Aortic Root3.6 (2.0-3.7cm)

PWd1.0 (0.7-1.1cm)Aortic Cusp Exc.2.0 (1.5-2.0cm)



LVDs3.2 (2.5-4.0cm)

PWs1.8 cm





Normal left ventricular chamber size, systolic function and wall motion.

Left ventricular ejection fraction estimated to be 60 %.

Mild left ventricular hypertrophy.

No evidence of pericardial effusion. 

All other cardiac chamber sizes are within normal limits. 

Focal aortic valve sclerosis with adequate cusp excursion.

Thickened mitral valve leaflets with normal excursion.

Mitral annulus and aortic root calcification.

Pulmonic valve not well visualized.

Normal tricuspid valve structure.  

IVC is normal in size with physiological collapse. 



A  color flow and spectral Doppler study was performed and revealed:

No aortic regurgitation.

No mitral regurgitation.

Mitral diastolic velocities suggest mild left ventricular diastolic dysfunction (Grade I).

No tricuspid regurgitation.

Tricuspid systolic velocities suggests peak right ventricular systolic pressure of 8 mmHg.

No pulmonic regurgitation present.

## 2019-11-11 NOTE — NUR
NURSE NOTES:

The patient is stable without acute distress or shortness of breath. Will continue plan of 
care.

## 2019-11-11 NOTE — NUR
NURSE NOTES:

Received pt and report from MARYA Staples. Observed pt resting in bed with both eyes open and 
watching television. Pt is A/Ox4. Cardiac monitor is in placed, IV site intact, 
asymptomatic, and patent; currently running 1/2 NS @50cc/hr. Bed is in the lowest position 
and locked, call light within reach. No signs/symptoms of acute distress noted at this this 
time. Will continue plan of care.

## 2019-11-11 NOTE — NUR
NURSE NOTES:

Notified Dr. Hook and Dr. Duran regarding low blood pressure upon taking orthostatic 
vital signs. The patient's supine blood pressure was 75/51 with heart rate of 135. The 
patient denied the chest pain, shortness of breath, acute dizziness, or change in mental 
status. Nursing intervention taken and changed the position of the patient. Dr. Hook 
ordered NS 500mL bolus. After changing position and starting bolus the patient's blood 
pressure goes upto 108/67 with pulse rate of 79. The patient denies of chest pain or 
shortness of breath. Will continue plan of care.

## 2019-11-11 NOTE — NEPHROLOGY PROGRESS NOTE
Assessment/Plan


Problem List:  


(1) Renal failure


Assessment:  resolved





(2) Diabetes


(3) COPD exacerbation


(4) Syncope


Assessment


Acute renal failure- Cr 2.6 on presentation now normalized


Syncope


HypoThyroidism


mild Anemia


Parkinsons


DM OOC


COPD 


Depression


HTN


CAD


LL Atx vs Infilt


Plan





add Starlix for DM


monitor renal parameters


slow hydrate


anemia briggs


optimize cardiac and pulmonary status


per orders / Consultants





Subjective


ROS Limited/Unobtainable:  No





Objective


Objective





Last 24 Hour Vital Signs








  Date Time  Temp Pulse Resp B/P (MAP) Pulse Ox O2 Delivery O2 Flow Rate FiO2


 


11/11/19 09:00      Room Air  21


 


11/11/19 09:00      Room Air  21


 


11/11/19 09:00      Room Air  


 


11/11/19 08:00 97.9 88 18 109/78 (88) 95   


 


11/11/19 06:00  86      





  91      





  97      


 


11/11/19 04:14 99.0 82 18 94/52 (66) 96   


 


11/11/19 04:02  88      


 


11/11/19 00:52  75      





  80      





  82      


 


11/11/19 00:00 97.5 75 18 107/57 (74) 96   


 


11/11/19 00:00  76      


 


11/10/19 21:36  77 18  96 Room Air  21


 


11/10/19 21:34  76 16  96 Room Air  21


 


11/10/19 21:00      Room Air  


 


11/10/19 20:00 97.3 78 16 118/70 (86) 98   


 


11/10/19 20:00  78      


 


11/10/19 18:10  93      


 


11/10/19 18:05  90      


 


11/10/19 18:00  88      


 


11/10/19 16:00      Room Air  


 


11/10/19 16:00  105      


 


11/10/19 16:00 98.0 88 20 102/64 (77) 95   


 


11/10/19 13:02  87      


 


11/10/19 12:00  87      





  91      





  98      


 


11/10/19 12:00      Room Air  


 


11/10/19 12:00 98.0 88 20 111/73 (86) 93   

















Intake and Output  


 


 11/10/19 11/11/19





 19:00 07:00


 


Intake Total 450 ml 


 


Output Total  1600 ml


 


Balance 450 ml -1600 ml


 


  


 


IV Total 450 ml 


 


Output Urine Total  1600 ml


 


# Voids  3


 


# Bowel Movements 3 1








Laboratory Tests


11/11/19 06:05: 


Sodium Level 146H, Potassium Level 4.2, Chloride Level 109H, Carbon Dioxide 

Level 28, Anion Gap 10, Blood Urea Nitrogen 8, Creatinine 1.2, Estimat 

Glomerular Filtration Rate > 60, Glucose Level 210H, Calcium Level 7.6L, 

Phosphorus Level 2.5, Magnesium Level 2.1, Iron Level 122, Total Iron Binding 

Capacity 306, Percent Iron Saturation 40, Unsaturated Iron Binding 184, 

Ferritin 270, Total Bilirubin 0.3, Aspartate Amino Transf (AST/SGOT) 87H, 

Alanine Aminotransferase (ALT/SGPT) 70, Alkaline Phosphatase 65, Total Protein 

6.6, Albumin 2.7L, Globulin 3.9, Albumin/Globulin Ratio 0.7L, Vitamin B12 Level 

878, Folate 12.6


Height (Feet):  6


Height (Inches):  0.00


Weight (Pounds):  193


General Appearance:  no apparent distress


Objective


no change











Cm Farias MD Nov 11, 2019 09:54

## 2019-11-11 NOTE — NUR
NURSE NOTES:

The patient completed Dobutamine stress test per Dr. Hook's order and under Dr. Ferrer's 
supervision. The patient is free from chest pain and shortness of breath. The patient has 
stable vital signs. The patient is stable without acute distress or shortness of breath. 
Will continue plan of care.

## 2019-11-11 NOTE — DIAGNOSTIC IMAGING REPORT
Indication:  chest pain. Syncope

 

Technique: The study was conducted under the supervision of a cardiologist.

Dolbutamine administration followed by intravenous administration of 30.2 mCi of

technetium 99m Myoview was performed. Three plane SPECT imaging of the heart was then

performed. A resting study was performed as part of the one-day protocol with 11 mCi

of technetium 99m myoview injected intravenously at that time. Three plane SPECT

imaging of the heart was obtained.

 

Comparison: None

 

Clinical data:  85% maximum predicted heart rate: 132.

 

Resting heart rate: 80.

 

Peak heart rate: 133. (Adequate stress)

 

Resting blood pressure: 118/75. Peak blood pressure: 124/55.

 

Resting EKG: Sinus rhythm.

 

1. Clinical response:  Non ischemic

2. Electrocardiographic response: Non ischemic

 

Findings: 

 

The myocardial perfusion scan demonstrates low left ventricular ejection fraction

estimated at about 43%. There are no reversible perfusion defects identified to

suggest ischemia. However the inferior wall is hypoperfused versus diaphragmatic

attenuation. These correlate clinically.

 

IMPRESSION:

 

Suspected diaphragmatic attenuation inferior wall.

 

No evidence of myocardial ischemia.

 

LVEF estimated at 43%

## 2019-11-11 NOTE — NUR
NURSE NOTES:

The patient successfully completed first step of Dobutamine stress test. The primary nurse 
was informed that Dr. Ferrer will come around 1230 for 2nd step of Dobutamine stress test. 
The patient is stable without acute distress or shortness of breath. Will continue plan of 
care.

## 2019-11-11 NOTE — CARDIAC ELECTROPHYSIOLOGY PN
Assessment/Plan


Assessment/Plan


1. Syncopal episode in a pt with bifascicular block.  The patient is already 

ruled out for myocardial


infarction with serial cardiac enzymes.  His urine is not clear.  The


electrolytes are not suggestive of dehydration.  We will watch the patient


on telemetry.  Stress test today results pending. May need EP study





2. Coronary artery disease with history of prior stent in January 2019.


The patient currently denies any chest pain.  Continue aspirin and


Lipitor.  The patient is off antihypertensive agents as blood pressure was


in the 90s initially and received IV fluid.





3. Paroxysmal atrial fibrillation, currently in sinus rhythm on Eliquis


5 mg b.i.d.  Off any antiarrhythmics.





4. Bifascicular block with right bundle-branch block and left anterior 

fascicular block.


5. Parkinson's, on Sinemet.


6. Hyperlipidemia.


7. Mild rhabdomyolysis.  The patient received IV fluid.


8. Renal failure.  Creatinine was 2.6, but followup creatinine has been


1.3 and normal.








ATILIO RN





Subjective


Subjective


Just had Dobutamine Cardiolite today. Results pending





Objective





Last 24 Hour Vital Signs








  Date Time  Temp Pulse Resp B/P (MAP) Pulse Ox O2 Delivery O2 Flow Rate FiO2


 


11/11/19 12:05  97      


 


11/11/19 12:00  121      


 


11/11/19 12:00  93      


 


11/11/19 12:00 99.9 91 18 108/75 (86) 96   


 


11/11/19 11:55  75      


 


11/11/19 09:00      Room Air  21


 


11/11/19 09:00      Room Air  21


 


11/11/19 09:00      Room Air  


 


11/11/19 08:00 97.9 88 18 109/78 (88) 95   


 


11/11/19 08:00  83      


 


11/11/19 06:00  86      





  91      





  97      


 


11/11/19 04:14 99.0 82 18 94/52 (66) 96   


 


11/11/19 04:02  88      


 


11/11/19 00:52  75      





  80      





  82      


 


11/11/19 00:00 97.5 75 18 107/57 (74) 96   


 


11/11/19 00:00  76      


 


11/10/19 21:36  77 18  96 Room Air  21


 


11/10/19 21:34  76 16  96 Room Air  21


 


11/10/19 21:00      Room Air  


 


11/10/19 20:00 97.3 78 16 118/70 (86) 98   


 


11/10/19 20:00  78      


 


11/10/19 18:10  93      


 


11/10/19 18:05  90      


 


11/10/19 18:00  88      


 


11/10/19 16:00      Room Air  


 


11/10/19 16:00  105      


 


11/10/19 16:00 98.0 88 20 102/64 (77) 95   

















Intake and Output  


 


 11/10/19 11/11/19





 19:00 07:00


 


Intake Total 450 ml 


 


Output Total  1600 ml


 


Balance 450 ml -1600 ml


 


  


 


IV Total 450 ml 


 


Output Urine Total  1600 ml


 


# Voids  3


 


# Bowel Movements 3 1











Laboratory Tests








Test


  11/11/19


06:05


 


Sodium Level


  146 MMOL/L


(136-145)  H


 


Potassium Level


  4.2 MMOL/L


(3.5-5.1)


 


Chloride Level


  109 MMOL/L


()  H


 


Carbon Dioxide Level


  28 MMOL/L


(21-32)


 


Anion Gap


  10 mmol/L


(5-15)


 


Blood Urea Nitrogen


  8 mg/dL (7-18)


 


 


Creatinine


  1.2 MG/DL


(0.55-1.30)


 


Estimat Glomerular Filtration


Rate > 60 mL/min


(>60)


 


Glucose Level


  210 MG/DL


()  H


 


Calcium Level


  7.6 MG/DL


(8.5-10.1)  L


 


Phosphorus Level


  2.5 MG/DL


(2.5-4.9)


 


Magnesium Level


  2.1 MG/DL


(1.8-2.4)


 


Iron Level


  122 ug/dL


()


 


Total Iron Binding Capacity


  306 ug/dL


(250-450)


 


Percent Iron Saturation 40 % (15-50)  


 


Unsaturated Iron Binding


  184 ug/dL


(112-346)


 


Ferritin


  270 NG/ML


(8-388)


 


Total Bilirubin


  0.3 MG/DL


(0.2-1.0)


 


Aspartate Amino Transf


(AST/SGOT) 87 U/L (15-37)


H


 


Alanine Aminotransferase


(ALT/SGPT) 70 U/L (12-78)


 


 


Alkaline Phosphatase


  65 U/L


()


 


Total Protein


  6.6 G/DL


(6.4-8.2)


 


Albumin


  2.7 G/DL


(3.4-5.0)  L


 


Globulin 3.9 g/dL  


 


Albumin/Globulin Ratio


  0.7 (1.0-2.7)


L


 


Vitamin B12 Level


  878 PG/ML


(193-986)


 


Folate


  12.6 NG/ML


(8.6-58.9)











Microbiology








 Date/Time


Source Procedure


Growth Status


 


 


 11/9/19 12:25


Blood Blood Culture - Preliminary


NO GROWTH AFTER 24 HOURS Resulted


 


 11/9/19 12:10


Blood Blood Culture - Preliminary


NO GROWTH AFTER 24 HOURS Resulted


 


 11/9/19 19:00


Nasal Nares MRSA Culture - Final


NO METHICILLIN RESISTANT STAPH AUREUS... Complete


 


 11/9/19 19:00


Rectum VRE Culture - Final


NO VANCOMYCIN RESISTANT ENTEROCOCCUS ... Complete


 


 11/9/19 19:00


Rectum - Final


NO CARBAPENEM-RESISTANT ENTEROBACTERI... Complete








Objective





HEAD AND NECK: No JVD.


LUNGS:  Clear.


CARDIOVASCULAR:  Regular S1 and S2 with no gallop or murmur.


ABDOMEN:  Soft.


EXTREMITIES:  No pitting edema.











Júnior Hook MD Nov 11, 2019 14:07

## 2019-11-11 NOTE — GENERAL PROGRESS NOTE
Assessment/Plan


Problem List:  


(1) Syncope


ICD Codes:  R55 - Syncope and collapse


SNOMED:  588670939


Qualifiers:  


   Qualified Codes:  R55 - Syncope and collapse


(2) Anemia


ICD Codes:  D64.9 - Anemia, unspecified


SNOMED:  710168217


(3) Atrial fibrillation


ICD Codes:  I48.91 - Unspecified atrial fibrillation


SNOMED:  48022545


(4) Uncontrolled diabetes mellitus with hyperglycemia


ICD Codes:  E11.65 - Type 2 diabetes mellitus with hyperglycemia


SNOMED:  83730378


(5) HTN (hypertension)


ICD Codes:  I10 - Essential (primary) hypertension


SNOMED:  23398594


(6) COPD (chronic obstructive pulmonary disease)


ICD Codes:  J44.9 - Chronic obstructive pulmonary disease, unspecified


SNOMED:  00661454


(7) Elevated lactic acid level


ICD Codes:  R79.89 - Other specified abnormal findings of blood chemistry


SNOMED:  8990634


(8) Moderate protein-calorie malnutrition


ICD Codes:  E44.0 - Moderate protein-calorie malnutrition


SNOMED:  150697494


Status:  stable


Assessment/Plan:


63 y/o male admitted to the Hospital with:





# Witnessed syncope


- Etiology include dehydration vs orthostatic hypotension vs underlying cardiac 

etiology vs anemia vs metabolic from uncontrolled DM and autonomic dysfunction 


- Continue monitoring on tele, for dobutamine stress test today. NPO 


- Orthostatic vital signs q 6 hr are stable


- TTE for EF and structural review. 


- Troponin follow up negative.


- Cardiology consult called to Dr. Hook


- Hold BP meds as bp low. 





# Acute renal failure


- Etiology include pre renal due to dehydration vs medication induced.


- STOP Metformin and Lisinopril pending response in renal function. Will hold 

off lisinopril due to low bp . Metformin hold, while in the hospital 


- Renal US, urine Cr and Na ordered


- Renal consultation requested with Dr. Mayo


- Trend renal function. improved after IVF


- IVF at 75 cc hr with 1/2 NS


- Avoid nephrotoxins





# Abnormal CXR with LL lobe atelectasis vs infiltrate


- No signs of acute pneumonia without cough, sputum, leukocytosis or fever. 


- Monitor and HOLD antibiotics for now


- Lactate was elevated on admission 3.9 and repeat was 2 - most likely due to 

syncopal event/dehydration 





# High TSH, Normal T4, low T3, ? Subclinical hypothyroidism 


- Endocrinology consult- Dr. Salazar 





# Bifascicular EKG block with NSR LAFB and RBBB


- Cardiology consulted


- Telemetry


- Trend K and Mag level 





# Mild Rhabdomyolysis


-  on admission


- Trend CK in am post IVF





# Recent admission at [facility] with EGD and Colonoscopy done (unknown 

results)


- Try to obtain records with SW





# Generalized weakness


- PT evaluation in AM





# HTN


- Hold lisinopril and metoprolol for now due to syncope and low blood pressure. 


- Monitor and discuss with cardiology next steps. 





# Constipation


-Bowel regimen





#CAD


#HTN


#HLD


#PAF 


- S/p PCI and stents in 1/19 by Dr. Bird. 


- Apixaban 


-Statin 


-hold BP meds 


- Follow up cardiology consult





#Uncontrolled DM (HbA1c 11.8)


-Hold Metformin 


-Insulin sliding scale


-will calculate total daily dose and discharge on insulin 


-Endocrine consult 








#Moderate protein calorie malnutrition 


-Nutrition consult 


-DC with home health 





# DVT ppx


- Apixaban therapy. will continue at 5 mg BID since renal function improved 

after IVF


# GI ppx


- PPI


# FULL CODE





I spent 40 minutes during this encounter. > 50% spent on counselling and care 

coordination. I spent an additional 31 minutes in chart review. d/w patient, rn

, consultants (endocrine and cardiology)





Subjective


Date patient seen:  Nov 11, 2019


Constitutional:  Reports: no symptoms, chills, diaphoresis, fever, malaise, 

other


HEENT:  Denies: no symptoms, eye pain, blurred vision, tearing, double vision, 

ear pain, ear discharge, nose pain, nose congestion, throat pain, throat 

swelling, mouth pain, mouth swelling, other


Cardiovascular:  Reports: no symptoms, chest pain, edema, irregular heart rate, 

palpitations, other


Respiratory:  Denies: no symptoms, cough, orthopnea, shortness of breath, SOB 

with excertion, SOB at rest, sputum, stridor, wheezing, other


Gastrointestinal/Abdominal:  Denies: no symptoms, abdomen distended, abdominal 

pain, black stools, tarry stools, blood in stool, constipated, diarrhea, 

difficulty swallowing, nausea, poor appetite, poor fluid intake, rectal bleeding

, vomiting, other


Genitourinary:  Denies: no symptoms, burning, discharge, frequency, flank pain, 

hematuria, incontinence, pain, urgency, other


Neurologic/Psychiatric:  Denies: no symptoms, anxiety, depressed, emotional 

problems, headache, numbness, paresthesia, pre-existing deficit, seizure, 

tingling, tremors, weakness, other


Endocrine:  Denies: no symptoms, excessive sweating, flushing, intolerance to 

cold, intolerance to heat, increased hunger, increased thirst, increased urine, 

unexplained weight gain, unexplained weight loss, other


Hematologic/Lymphatic:  Denies: no symptoms, anemia, easy bleeding, easy 

bruising, other


Allergies:  


Coded Allergies:  


     SHARK LIVER OIL (Unverified  Allergy, Unknown, 8/6/16)


Uncoded Allergies:  


     SHARK (Allergy, Mild, RASH, 4/11/16)


Subjective


seen and examined. feels weak. main complaint is lightheadedness. BP low. labs 

reviewed. NPO for dobutamine stress test by cardiology. Tele reviewed. some 

episodes of sinus tachycardia , one at this am 137 bpm, some PACs. 


Tells me he was recently admitted last week to [facility] where he 

underwent EGD/Colon for anemia (reportedly normal) and received IV iron infusion





Objective





Last 24 Hour Vital Signs








  Date Time  Temp Pulse Resp B/P (MAP) Pulse Ox O2 Delivery O2 Flow Rate FiO2


 


11/11/19 06:00  86      





  91      





  97      


 


11/11/19 04:14 99.0 82 18 94/52 (66) 96   


 


11/11/19 04:02  88      


 


11/11/19 00:52  75      





  80      





  82      


 


11/11/19 00:00 97.5 75 18 107/57 (74) 96   


 


11/11/19 00:00  76      


 


11/10/19 21:36  77 18  96 Room Air  21


 


11/10/19 21:34  76 16  96 Room Air  21


 


11/10/19 21:00      Room Air  


 


11/10/19 20:00 97.3 78 16 118/70 (86) 98   


 


11/10/19 20:00  78      


 


11/10/19 18:10  93      


 


11/10/19 18:05  90      


 


11/10/19 18:00  88      


 


11/10/19 16:00      Room Air  


 


11/10/19 16:00  105      


 


11/10/19 16:00 98.0 88 20 102/64 (77) 95   


 


11/10/19 13:02  87      


 


11/10/19 12:00  87      





  91      





  98      


 


11/10/19 12:00      Room Air  


 


11/10/19 12:00 98.0 88 20 111/73 (86) 93   


 


11/10/19 09:33  87 18  98 Room Air  21


 


11/10/19 09:33  87 16  98 Room Air  21


 


11/10/19 09:20  87      

















Intake and Output  


 


 11/10/19 11/11/19





 19:00 07:00


 


Intake Total 450 ml 


 


Output Total  1600 ml


 


Balance 450 ml -1600 ml


 


  


 


IV Total 450 ml 


 


Output Urine Total  1600 ml


 


# Voids  3


 


# Bowel Movements 3 1








Laboratory Tests


11/11/19 06:05: 


Sodium Level 146H, Potassium Level 4.2, Chloride Level 109H, Carbon Dioxide 

Level 28, Anion Gap 10, Blood Urea Nitrogen 8, Creatinine 1.2, Estimat 

Glomerular Filtration Rate > 60, Glucose Level 210H, Calcium Level 7.6L, 

Phosphorus Level 2.5, Magnesium Level 2.1, Iron Level [Pending], Unsaturated 

Iron Binding [Pending], Ferritin 270, Total Bilirubin 0.3, Aspartate Amino 

Transf (AST/SGOT) 87H, Alanine Aminotransferase (ALT/SGPT) 70, Alkaline 

Phosphatase 65, Total Protein 6.6, Albumin 2.7L, Globulin 3.9, Albumin/Globulin 

Ratio 0.7L, Vitamin B12 Level [Pending], Folate [Pending]


Height (Feet):  6


Height (Inches):  0.00


Weight (Pounds):  193


Objective


General Appearance:  WD/WN, no apparent distress


Lines, tubes and drains:  peripheral


HEENT:  normocephalic, atraumatic


Neck:  non-tender, normal alignment


Respiratory/Chest:  lungs clear, normal breath sounds


Cardiovascular/Chest:  normal rate, regular rhythm, no JVD


Abdomen:  normal bowel sounds, soft


Extremities:  normal range of motion


Skin Exam:  normal pigmentation


Neurologic:  CNs II-XII grossly normal, alert, oriented x 3











Andres Duran M.D. Nov 11, 2019 08:00

## 2019-11-11 NOTE — NUR
CASE MANAGEMENT:REVIEW



11/11/19

SI:SYNCOPE. AFIB. UNCONTROLLED DM

99.9   91  18  108/75  96% ON RA

GLUCOSE+210    CA-7.6



IS: IV LEXISCAN X1

STARLIX PO TID

AMANTADINE PO QD

ASA PO QD

ELIQUIS PO BID

NEURONTIN PO TID

ALBUTEROL INH Q12

KHURRAM-DUR PO Q12

**: TELEMETRY STATUS

DCP: FROM HOME



PLAN:

MYOCARDIAL STRESS TEST FOR TODAY

## 2019-11-11 NOTE — NUR
RESPIRATORY NOTE: Received phone order from Bel Castro to hold 0900 Inhaler until further 
notice per MD. Will hold; then, non-admin beyond 0900.

## 2019-11-11 NOTE — NUR
HAND-OFF: 

Report given to MARYA Boyd. The patient is resting on the bed without acute distress or 
shortness of breath. The patient's bed in the lowest position, call light in reach, and fall 
and aspiration precaution reinforced. IV site is intact and patent. Endorsed plan of care.

## 2019-11-11 NOTE — NUR
NURSE NOTES:

Received report from MARYA Viera. The patient is resting on the bed without acute distress or 
shortness of breath. The patient's bed in the lowest position, call light in reach, and fall 
and aspiration precaution reinforced. IV sites are intact and patent. The patient is 
scheduled for dobutamine stress test since the patient has contraindication to Lien scan due 
to active history of asthma on Albuterol and Theophyline and SBP<100. Per Dr. Hook, hold 
Albuterol breathing treatment and Theophyline prior to the Dobutamine test today. Dr. Hook 
and Dr. Duran was notified regarding ST and PACs. Will continue plan of care.

## 2019-11-12 VITALS — DIASTOLIC BLOOD PRESSURE: 71 MMHG | SYSTOLIC BLOOD PRESSURE: 143 MMHG

## 2019-11-12 VITALS — SYSTOLIC BLOOD PRESSURE: 109 MMHG | DIASTOLIC BLOOD PRESSURE: 66 MMHG

## 2019-11-12 VITALS — DIASTOLIC BLOOD PRESSURE: 86 MMHG | SYSTOLIC BLOOD PRESSURE: 121 MMHG

## 2019-11-12 VITALS — SYSTOLIC BLOOD PRESSURE: 120 MMHG | DIASTOLIC BLOOD PRESSURE: 75 MMHG

## 2019-11-12 VITALS — DIASTOLIC BLOOD PRESSURE: 63 MMHG | SYSTOLIC BLOOD PRESSURE: 101 MMHG

## 2019-11-12 VITALS — DIASTOLIC BLOOD PRESSURE: 61 MMHG | SYSTOLIC BLOOD PRESSURE: 127 MMHG

## 2019-11-12 LAB
ADD MANUAL DIFF: NO
ANION GAP SERPL CALC-SCNC: 8 MMOL/L (ref 5–15)
BASOPHILS NFR BLD AUTO: 0.9 % (ref 0–2)
BUN SERPL-MCNC: 5 MG/DL (ref 7–18)
CALCIUM SERPL-MCNC: 7.8 MG/DL (ref 8.5–10.1)
CHLORIDE SERPL-SCNC: 107 MMOL/L (ref 98–107)
CO2 SERPL-SCNC: 27 MMOL/L (ref 21–32)
CREAT SERPL-MCNC: 1.1 MG/DL (ref 0.55–1.3)
EOSINOPHIL NFR BLD AUTO: 2.8 % (ref 0–3)
ERYTHROCYTE [DISTWIDTH] IN BLOOD BY AUTOMATED COUNT: 14 % (ref 11.6–14.8)
HCT VFR BLD CALC: 30.3 % (ref 42–52)
HGB BLD-MCNC: 10.1 G/DL (ref 14.2–18)
LYMPHOCYTES NFR BLD AUTO: 32.5 % (ref 20–45)
MCV RBC AUTO: 78 FL (ref 80–99)
MONOCYTES NFR BLD AUTO: 11.2 % (ref 1–10)
NEUTROPHILS NFR BLD AUTO: 52.6 % (ref 45–75)
PLATELET # BLD: 256 K/UL (ref 150–450)
POTASSIUM SERPL-SCNC: 3.7 MMOL/L (ref 3.5–5.1)
RBC # BLD AUTO: 3.86 M/UL (ref 4.7–6.1)
SODIUM SERPL-SCNC: 142 MMOL/L (ref 136–145)
WBC # BLD AUTO: 4 K/UL (ref 4.8–10.8)

## 2019-11-12 RX ADMIN — INSULIN ASPART SCH UNITS: 100 INJECTION, SOLUTION INTRAVENOUS; SUBCUTANEOUS at 12:20

## 2019-11-12 RX ADMIN — THEOPHYLLINE ANHYDROUS SCH MG: 100 CAPSULE, EXTENDED RELEASE ORAL at 08:12

## 2019-11-12 RX ADMIN — DOCUSATE SODIUM SCH MG: 100 CAPSULE, LIQUID FILLED ORAL at 08:11

## 2019-11-12 RX ADMIN — CARBIDOPA AND LEVODOPA SCH TAB: 25; 100 TABLET ORAL at 13:08

## 2019-11-12 RX ADMIN — ALBUTEROL SULFATE SCH PUFF: 108 AEROSOL, METERED RESPIRATORY (INHALATION) at 21:00

## 2019-11-12 RX ADMIN — INSULIN ASPART SCH UNITS: 100 INJECTION, SOLUTION INTRAVENOUS; SUBCUTANEOUS at 16:59

## 2019-11-12 RX ADMIN — ASPIRIN 81 MG SCH MG: 81 TABLET ORAL at 08:11

## 2019-11-12 RX ADMIN — CARBIDOPA AND LEVODOPA SCH TAB: 25; 100 TABLET ORAL at 16:57

## 2019-11-12 RX ADMIN — INSULIN DETEMIR SCH UNITS: 100 INJECTION, SOLUTION SUBCUTANEOUS at 21:06

## 2019-11-12 RX ADMIN — THEOPHYLLINE ANHYDROUS SCH MG: 100 CAPSULE, EXTENDED RELEASE ORAL at 21:03

## 2019-11-12 RX ADMIN — ALBUTEROL SULFATE SCH PUFF: 108 AEROSOL, METERED RESPIRATORY (INHALATION) at 08:29

## 2019-11-12 RX ADMIN — NATEGLINIDE SCH MG: 60 TABLET ORAL at 11:43

## 2019-11-12 RX ADMIN — APIXABAN SCH MG: 5 TABLET, FILM COATED ORAL at 16:57

## 2019-11-12 RX ADMIN — AMANTADINE HYDROCHLORIDE SCH MG: 100 CAPSULE ORAL at 08:12

## 2019-11-12 RX ADMIN — DOCUSATE SODIUM SCH MG: 100 CAPSULE, LIQUID FILLED ORAL at 21:00

## 2019-11-12 RX ADMIN — APIXABAN SCH MG: 5 TABLET, FILM COATED ORAL at 08:11

## 2019-11-12 RX ADMIN — INSULIN ASPART SCH UNITS: 100 INJECTION, SOLUTION INTRAVENOUS; SUBCUTANEOUS at 21:06

## 2019-11-12 RX ADMIN — NATEGLINIDE SCH MG: 60 TABLET ORAL at 06:19

## 2019-11-12 RX ADMIN — INSULIN ASPART SCH UNITS: 100 INJECTION, SOLUTION INTRAVENOUS; SUBCUTANEOUS at 06:21

## 2019-11-12 RX ADMIN — CARBIDOPA AND LEVODOPA SCH TAB: 25; 100 TABLET ORAL at 08:12

## 2019-11-12 RX ADMIN — TAMSULOSIN HYDROCHLORIDE SCH MG: 0.4 CAPSULE ORAL at 21:03

## 2019-11-12 RX ADMIN — ATORVASTATIN CALCIUM SCH MG: 80 TABLET, FILM COATED ORAL at 21:04

## 2019-11-12 NOTE — NUR
NURSE NOTES:

Patient's sacral wound dressing changed and patient repositioned in bed.  Patient tolerated 
the dressing change well.  Bed in the lowest position and call light within reach.

## 2019-11-12 NOTE — NUR
NURSE NOTES:

Received pt and report from MARYA Andrade. Observed pt resting in bed with both eyes open and 
watching television. Pt is A/Ox4. IV site intact, asymptomatic, and patent; currently 
running 1/2 NS at 50cc/hr. Bed is in the lowest position and locked. Call light and beside 
table within reach. No signs/symptoms of acute distress noted at this time. Will continue 
plan of care.

## 2019-11-12 NOTE — CARDIAC ELECTROPHYSIOLOGY PN
Assessment/Plan


Assessment/Plan


1. Syncopal episode in a pt with bifascicular block.  The patient is already 

ruled out for myocardial


infarction with serial cardiac enzymes.   The electrolytes are not suggestive 

of dehydration.  We will watch the patient


on telemetry.  Stress test showed no ischemia. May need EP study





2. CAD, S/P stent in January 2019. The patient currently denies any chest pain.

  Continue aspirin and


Lipitor.  The patient is off antihypertensive agents as blood pressure was


in the 90s initially and received IV fluid.





3. Paroxysmal atrial fibrillation, in sinus rhythm on Eliquis 5 mg b.i.d.  Off 

any antiarrhythmics.





4. Bifascicular block with right bundle-branch block and left anterior 

fascicular block.


5. Parkinson's, on Sinemet.


6. Hyperlipidemia.


7. Mild rhabdomyolysis.  The patient received IV fluid.


8. Renal failure.  Creatinine was 2.6, but followup creatinine has been


1.3 and normal.








ATILIO RN





Subjective


Subjective


Had Dobutamine Cardiolite yesterday that showed no ischemia.





Objective





Last 24 Hour Vital Signs








  Date Time  Temp Pulse Resp B/P (MAP) Pulse Ox O2 Delivery O2 Flow Rate FiO2


 


11/12/19 08:00 97.6 80 19 143/71 (95) 95   


 


11/12/19 06:10  98      


 


11/12/19 06:05  85      


 


11/12/19 06:00  80      


 


11/12/19 04:00 97.5 101 18 109/66 (80) 95   


 


11/12/19 04:00  115      


 


11/12/19 00:10  97      


 


11/12/19 00:05  98      


 


11/12/19 00:00 98.0 96 17 101/63 (76) 97   


 


11/12/19 00:00  86      


 


11/12/19 00:00  96      


 


11/11/19 21:55  85 18  95 Room Air  21


 


11/11/19 21:53  82 16  95 Room Air  21


 


11/11/19 21:00      Room Air  


 


11/11/19 20:00  84      


 


11/11/19 20:00 97.7 88 19 110/68 (82) 96   


 


11/11/19 19:00 98.1 83 18 114/71 (85) 97   


 


11/11/19 18:30  106      


 


11/11/19 18:25  130      


 


11/11/19 18:20  135      


 


11/11/19 16:00 98.4 86 18 109/67 (81) 96   


 


11/11/19 16:00  96      


 


11/11/19 12:05  97      


 


11/11/19 12:00  121      


 


11/11/19 12:00  93      


 


11/11/19 12:00 99.9 91 18 108/75 (86) 96   


 


11/11/19 11:55  75      


 


11/11/19 09:00      Room Air  21


 


11/11/19 09:00      Room Air  21


 


11/11/19 09:00      Room Air  

















Intake and Output  


 


 11/11/19 11/12/19





 19:00 07:00


 


  


 


# Voids  1


 


# Bowel Movements  1











Microbiology








 Date/Time


Source Procedure


Growth Status


 


 


 11/9/19 12:25


Blood Blood Culture - Preliminary


NO GROWTH AFTER 48 HOURS Resulted


 


 11/9/19 12:10


Blood Blood Culture - Preliminary


NO GROWTH AFTER 48 HOURS Resulted


 


 11/9/19 19:00


Nasal Nares MRSA Culture - Final


NO METHICILLIN RESISTANT STAPH AUREUS... Complete


 


 11/9/19 19:00


Rectum VRE Culture - Final


NO VANCOMYCIN RESISTANT ENTEROCOCCUS ... Complete


 


 11/9/19 19:00


Rectum - Final


NO CARBAPENEM-RESISTANT ENTEROBACTERI... Complete








Objective


HEAD AND NECK: No JVD.


LUNGS:  Clear.


CARDIOVASCULAR:  Regular S1 and S2 with no gallop or murmur.


ABDOMEN:  Soft.


EXTREMITIES:  No pitting edema.











Júnior Hook MD Nov 12, 2019 08:14

## 2019-11-12 NOTE — NEPHROLOGY PROGRESS NOTE
Assessment/Plan


Problem List:  


(1) Renal failure


Assessment:  resolved





(2) Diabetes


(3) COPD exacerbation


(4) Syncope


Assessment


Acute renal failure- Cr 2.6 on presentation now normalized


Syncope


HypoThyroidism


mild Anemia


Parkinsons


DM OOC


COPD 


Depression


HTN


CAD


LL Atx vs Infilt


Plan





add Starlix for DM


monitor renal parameters


slow hydrate


anemia briggs


optimize cardiac and pulmonary status


per orders / Consultants





Subjective


ROS Limited/Unobtainable:  No





Objective


Objective





Last 24 Hour Vital Signs








  Date Time  Temp Pulse Resp B/P (MAP) Pulse Ox O2 Delivery O2 Flow Rate FiO2


 


11/12/19 08:31  81 20  95 Room Air  21


 


11/12/19 08:30  79 20  95 Room Air  21


 


11/12/19 08:00 97.6 80 19 143/71 (95) 95   


 


11/12/19 07:43  92      


 


11/12/19 06:10  98      


 


11/12/19 06:05  85      


 


11/12/19 06:00  80      


 


11/12/19 04:00 97.5 101 18 109/66 (80) 95   


 


11/12/19 04:00  115      


 


11/12/19 00:10  97      


 


11/12/19 00:05  98      


 


11/12/19 00:00 98.0 96 17 101/63 (76) 97   


 


11/12/19 00:00  86      


 


11/12/19 00:00  96      


 


11/11/19 21:55  85 18  95 Room Air  21


 


11/11/19 21:53  82 16  95 Room Air  21


 


11/11/19 21:00      Room Air  


 


11/11/19 20:00  84      


 


11/11/19 20:00 97.7 88 19 110/68 (82) 96   


 


11/11/19 19:00 98.1 83 18 114/71 (85) 97   


 


11/11/19 18:30  106      


 


11/11/19 18:25  130      


 


11/11/19 18:20  135      


 


11/11/19 16:00 98.4 86 18 109/67 (81) 96   


 


11/11/19 16:00  96      

















Intake and Output  


 


 11/11/19 11/12/19





 19:00 07:00


 


Intake Total  120 ml


 


Balance  120 ml


 


  


 


Intake Oral  120 ml


 


# Voids  1


 


# Bowel Movements  1








Laboratory Tests


11/12/19 09:45: 


White Blood Count 4.0L, Red Blood Count 3.86L, Hemoglobin 10.1L, Hematocrit 

30.3L, Mean Corpuscular Volume 78L, Mean Corpuscular Hemoglobin 26.2L, Mean 

Corpuscular Hemoglobin Concent 33.3, Red Cell Distribution Width 14.0, Platelet 

Count 256, Mean Platelet Volume 5.6L, Neutrophils (%) (Auto) 52.6, Lymphocytes (

%) (Auto) 32.5, Monocytes (%) (Auto) 11.2H, Eosinophils (%) (Auto) 2.8, 

Basophils (%) (Auto) 0.9, Sodium Level 142, Potassium Level 3.7, Chloride Level 

107, Carbon Dioxide Level 27, Anion Gap 8, Blood Urea Nitrogen 5L, Creatinine 

1.1, Estimat Glomerular Filtration Rate > 60, Glucose Level 208H, Calcium Level 

7.8L


Height (Feet):  6


Height (Inches):  0.00


Weight (Pounds):  193


General Appearance:  no apparent distress


Objective


no change











Cm Farias MD Nov 12, 2019 12:23

## 2019-11-12 NOTE — GENERAL PROGRESS NOTE
Assessment/Plan


Problem List:  


(1) Syncope


ICD Codes:  R55 - Syncope and collapse


SNOMED:  973546717


Qualifiers:  


   Qualified Codes:  R55 - Syncope and collapse


(2) Anemia


ICD Codes:  D64.9 - Anemia, unspecified


SNOMED:  574209691


(3) Atrial fibrillation


ICD Codes:  I48.91 - Unspecified atrial fibrillation


SNOMED:  97146905


(4) Uncontrolled diabetes mellitus with hyperglycemia


ICD Codes:  E11.65 - Type 2 diabetes mellitus with hyperglycemia


SNOMED:  88917081


(5) HTN (hypertension)


ICD Codes:  I10 - Essential (primary) hypertension


SNOMED:  58342944


(6) COPD (chronic obstructive pulmonary disease)


ICD Codes:  J44.9 - Chronic obstructive pulmonary disease, unspecified


SNOMED:  94130097


(7) Elevated lactic acid level


ICD Codes:  R79.89 - Other specified abnormal findings of blood chemistry


SNOMED:  9867162


(8) Moderate protein-calorie malnutrition


ICD Codes:  E44.0 - Moderate protein-calorie malnutrition


SNOMED:  305895910


Status:  stable


Assessment/Plan:


65 y/o male admitted to the Hospital with:





# Witnessed syncope


- Etiology include dehydration vs orthostatic hypotension vs underlying cardiac 

etiology vs anemia vs metabolic from uncontrolled DM and autonomic dysfunction 


- Continue monitoring on tele


-Stress mata tnegative for active ischemia, reduced EF noted 


- Orthostatic vital signs q 6 hr are stable


- TTE reviewed 


- Troponin follow up negative.


- Cardiology consult called to Dr. Hook. 


- Hold BP meds as bp low. hypotensive last night, responded to 750 ml bolus of 

NS. 





# Acute renal failure- resolved 


- Etiology include pre renal due to dehydration vs medication induced.


- STOP Metformin and Lisinopril pending response in renal function. Will hold 

off lisinopril due to low bp . Metformin held, while in the hospital 


- Renal US, urine Cr and Na ordered


- Renal consultation requested with Dr. Mayo


- Trend renal function. improved after IVF


- IVF 


- Avoid nephrotoxins





# Abnormal CXR with LL lobe atelectasis vs infiltrate


- No signs of acute pneumonia without cough, sputum, leukocytosis or fever. 


- Monitor and HOLD antibiotics for now


- Lactate was elevated on admission 3.9 and repeat was 2 - most likely due to 

syncopal event/dehydration 





# High TSH, Normal T4, low T3, ? Subclinical hypothyroidism 


- Endocrinology consult- Dr. Nazemi- repeat TSH 3.7 





# Bifascicular EKG block with NSR LAFB and RBBB


- Cardiology consulted


- Telemetry


- Trend K and Mag level 





# Mild Rhabdomyolysis


-  on admission


- Trend CK in am post IVF





# Recent admission at [facility] with EGD and Colonoscopy done (unknown 

results) per patient normal 


- Try to obtain records with SW





# Generalized weakness


- PT evaluation for dc planning 





# Constipation


-Bowel regimen





#CAD


#HTN


#HLD


#PAF 


- S/p PCI and stents in 1/19 by Dr. Bird. 


- Apixaban 


-Statin 


-hold BP meds (lisinopril and metoprolol), may need digoxin for rate control 


- Follow up cardiology consult





#Uncontrolled DM (HbA1c 11.8)


-Hold Metformin 


-Insulin sliding scale


-will calculate total daily dose and discharge on insulin 


-Endocrine consult 








#Moderate protein calorie malnutrition 


-Nutrition consult 


-DC with home health 





# DVT ppx


- Apixaban therapy. will continue at 5 mg BID since renal function improved 

after IVF


# GI ppx


- PPI


# FULL CODE





I spent 40 minutes during this encounter. > 50% spent on counselling and care 

coordination. I spent an additional 31 minutes in chart review. d/w patient, rn

, consultants (endocrine and cardiology)





Subjective


Date patient seen:  Nov 12, 2019


ROS Limited/Unobtainable:  No


Constitutional:  Reports: no symptoms, chills, diaphoresis, fever, malaise, 

other


HEENT:  Denies: no symptoms, eye pain, blurred vision, tearing, double vision, 

ear pain, ear discharge, nose pain, nose congestion, throat pain, throat 

swelling, mouth pain, mouth swelling, other


Cardiovascular:  Denies: no symptoms, chest pain, edema, irregular heart rate, 

lightheadedness, palpitations, syncope, other


Respiratory:  Denies: no symptoms, cough, orthopnea, shortness of breath, SOB 

with excertion, SOB at rest, sputum, stridor, wheezing, other


Gastrointestinal/Abdominal:  Denies: no symptoms, abdomen distended, abdominal 

pain, black stools, tarry stools, blood in stool, constipated, diarrhea, 

difficulty swallowing, nausea, poor appetite, poor fluid intake, rectal bleeding

, vomiting, other


Genitourinary:  Denies: no symptoms, burning, discharge, frequency, flank pain, 

hematuria, incontinence, pain, urgency, other


Neurologic/Psychiatric:  Denies: no symptoms, anxiety, depressed, emotional 

problems, headache, numbness, paresthesia, pre-existing deficit, seizure, 

tingling, tremors, weakness, other


Endocrine:  Denies: no symptoms, excessive sweating, flushing, intolerance to 

cold, intolerance to heat, increased hunger, increased thirst, increased urine, 

unexplained weight gain, unexplained weight loss, other


Allergies:  


Coded Allergies:  


     SHARK LIVER OIL (Unverified  Allergy, Unknown, 8/6/16)


Uncoded Allergies:  


     SHARK (Allergy, Mild, RASH, 4/11/16)


Subjective


low bp last night, required bolus of NS, 750ml. BP better, no complaints. Labs 

pending. Stress test results reviewed





Objective





Last 24 Hour Vital Signs








  Date Time  Temp Pulse Resp B/P (MAP) Pulse Ox O2 Delivery O2 Flow Rate FiO2


 


11/12/19 08:31  81 20  95 Room Air  21


 


11/12/19 08:30  79 20  95 Room Air  21


 


11/12/19 08:00 97.6 80 19 143/71 (95) 95   


 


11/12/19 06:10  98      


 


11/12/19 06:05  85      


 


11/12/19 06:00  80      


 


11/12/19 04:00 97.5 101 18 109/66 (80) 95   


 


11/12/19 04:00  115      


 


11/12/19 00:10  97      


 


11/12/19 00:05  98      


 


11/12/19 00:00 98.0 96 17 101/63 (76) 97   


 


11/12/19 00:00  86      


 


11/12/19 00:00  96      


 


11/11/19 21:55  85 18  95 Room Air  21


 


11/11/19 21:53  82 16  95 Room Air  21


 


11/11/19 21:00      Room Air  


 


11/11/19 20:00  84      


 


11/11/19 20:00 97.7 88 19 110/68 (82) 96   


 


11/11/19 19:00 98.1 83 18 114/71 (85) 97   


 


11/11/19 18:30  106      


 


11/11/19 18:25  130      


 


11/11/19 18:20  135      


 


11/11/19 16:00 98.4 86 18 109/67 (81) 96   


 


11/11/19 16:00  96      


 


11/11/19 12:05  97      


 


11/11/19 12:00  121      


 


11/11/19 12:00  93      


 


11/11/19 12:00 99.9 91 18 108/75 (86) 96   


 


11/11/19 11:55  75      


 


11/11/19 09:00      Room Air  21


 


11/11/19 09:00      Room Air  21


 


11/11/19 09:00      Room Air  

















Intake and Output  


 


 11/11/19 11/12/19





 19:00 07:00


 


Intake Total  120 ml


 


Balance  120 ml


 


  


 


Intake Oral  120 ml


 


# Voids  1


 


# Bowel Movements  1








Height (Feet):  6


Height (Inches):  0.00


Weight (Pounds):  193


Objective


General Appearance:  WD/WN, no apparent distress


Lines, tubes and drains:  peripheral


HEENT:  normocephalic, atraumatic


Neck:  non-tender, normal alignment


Respiratory/Chest:  lungs clear, normal breath sounds


Cardiovascular/Chest:  normal rate, regular rhythm, no JVD


Abdomen:  normal bowel sounds, soft


Extremities:  normal range of motion


Skin Exam:  normal pigmentation


Neurologic:  CNs II-XII grossly normal, alert, oriented x 3











Andres Duran M.D. Nov 12, 2019 08:47

## 2019-11-12 NOTE — NUR
NURSE NOTES:

Pt is asleep in bed with both eyes closed; arousable to voice. No signs/symptoms of acute 
distress noted at this time. Will continue plan of care.

## 2019-11-12 NOTE — CONSULTATION
DATE OF CONSULTATION:  11/12/2019

NEPHROLOGY CONSULTATION



CONSULTING PHYSICIAN:  Mitch Salazar M.D.



REFERRING PHYSICIAN:  John Baird M.D.



REASON FOR CONSULTATION:

1. Abnormal TSH.

2. Diabetes management.



HISTORY OF PRESENT ILLNESS:  The patient is a 64-year-old 

male who was recently discharged from [facility] few days ago,

having an EGD and colonoscopy yesterday without any GI bleed or other

findings.



The patient has history of diabetes, coronary artery disease status post

PCI and stent placement in January 2019 by Dr. Trev Bird at

[facility].  The patient takes insulin for his diabetes mellitus on

Lantus and NovoLog as well as metformin.  He is on anticoagulation with

Apixaban.  The patient was admitted to the hospital on November 9, 2019

after he was at a store and passed out at bus stop.  He had a syncopal

episode, admitted to the hospital for observation and treatment.  Thyroid

function was obtained, which showed abnormal TSH of 18.6 with a normal T4

and T3.  The patient has no history of hypothyroidism.



ALLERGIES:  Shark liver.



MEDICATIONS:  Reviewed and reconciled.



PAST MEDICAL HISTORY:

1. Coronary artery disease.

2. Diabetes.

3. Hypertension.



PAST SURGICAL HISTORY:  Coronary artery stent placement.



FAMILY HISTORY:  Diabetes.



REVIEW OF SYSTEMS:  A 12-point review of systems was performed.  The

pertinent positives are mentioned present illness.



PHYSICAL EXAMINATION:

VITAL SIGNS:  Temperature of 98.2, pulse of 79, /80, pulse oximetry 96%.



LABORATORY VALUES:  Thyroid function discussed in present illness.  WBC 4,

hemoglobin 10, hematocrit 30, platelet of 256.  Sodium 142, potassium 3.7,

chloride 107, bicarbonate 27, BUN 5, creatinine 1.1, glucose of 208.

Hemoglobin A1c is 11.8.



DIAGNOSES:

1. Syncope.

2. Abnormal TSH.

3. Diabetes.



PLAN:

1. TSH was repeated and at this time came as normal at 3.7 so first one

was mostly likely a lab error.  He does not needed to be treated with

thyroid hormone.  I recommend to repeat the thyroid function as an

outpatient in 4 weeks.

2. Diabetes out of control, currently glucose in the 100s range.

Continue glimepiride 120 mg before each meal.  Continue NovoLog sliding

scale.  We will add 6 units of basal insulin Levemir at bedtime. I will

follow the patient closely during hospital stay.



Thank you, Dr. Baird, for the courtesy of this consultation.









  ______________________________________________

  Mitch Salazar M.D.





DR:  RN/apm

D:  11/12/2019 18:24

T:  11/12/2019 20:22

JOB#:  [id number]

CC:



ELVI

## 2019-11-13 VITALS — DIASTOLIC BLOOD PRESSURE: 78 MMHG | SYSTOLIC BLOOD PRESSURE: 121 MMHG

## 2019-11-13 VITALS — SYSTOLIC BLOOD PRESSURE: 102 MMHG | DIASTOLIC BLOOD PRESSURE: 68 MMHG

## 2019-11-13 VITALS — DIASTOLIC BLOOD PRESSURE: 68 MMHG | SYSTOLIC BLOOD PRESSURE: 117 MMHG

## 2019-11-13 VITALS — DIASTOLIC BLOOD PRESSURE: 64 MMHG | SYSTOLIC BLOOD PRESSURE: 102 MMHG

## 2019-11-13 VITALS — SYSTOLIC BLOOD PRESSURE: 115 MMHG | DIASTOLIC BLOOD PRESSURE: 73 MMHG

## 2019-11-13 VITALS — DIASTOLIC BLOOD PRESSURE: 74 MMHG | SYSTOLIC BLOOD PRESSURE: 110 MMHG

## 2019-11-13 LAB
ANION GAP SERPL CALC-SCNC: 10 MMOL/L (ref 5–15)
BUN SERPL-MCNC: 5 MG/DL (ref 7–18)
CALCIUM SERPL-MCNC: 8.4 MG/DL (ref 8.5–10.1)
CHLORIDE SERPL-SCNC: 109 MMOL/L (ref 98–107)
CO2 SERPL-SCNC: 26 MMOL/L (ref 21–32)
CREAT SERPL-MCNC: 1 MG/DL (ref 0.55–1.3)
POTASSIUM SERPL-SCNC: 3.8 MMOL/L (ref 3.5–5.1)
SODIUM SERPL-SCNC: 145 MMOL/L (ref 136–145)

## 2019-11-13 RX ADMIN — THEOPHYLLINE ANHYDROUS SCH MG: 100 CAPSULE, EXTENDED RELEASE ORAL at 09:12

## 2019-11-13 RX ADMIN — INSULIN ASPART SCH UNITS: 100 INJECTION, SOLUTION INTRAVENOUS; SUBCUTANEOUS at 21:46

## 2019-11-13 RX ADMIN — DOCUSATE SODIUM SCH MG: 100 CAPSULE, LIQUID FILLED ORAL at 09:11

## 2019-11-13 RX ADMIN — TAMSULOSIN HYDROCHLORIDE SCH MG: 0.4 CAPSULE ORAL at 21:26

## 2019-11-13 RX ADMIN — METOPROLOL SUCCINATE SCH MG: 25 TABLET, EXTENDED RELEASE ORAL at 14:13

## 2019-11-13 RX ADMIN — CARBIDOPA AND LEVODOPA SCH TAB: 25; 100 TABLET ORAL at 12:07

## 2019-11-13 RX ADMIN — INSULIN ASPART SCH UNITS: 100 INJECTION, SOLUTION INTRAVENOUS; SUBCUTANEOUS at 17:08

## 2019-11-13 RX ADMIN — THEOPHYLLINE ANHYDROUS SCH MG: 100 CAPSULE, EXTENDED RELEASE ORAL at 21:26

## 2019-11-13 RX ADMIN — ASPIRIN 81 MG SCH MG: 81 TABLET ORAL at 09:11

## 2019-11-13 RX ADMIN — CARBIDOPA AND LEVODOPA SCH TAB: 25; 100 TABLET ORAL at 17:08

## 2019-11-13 RX ADMIN — ALBUTEROL SULFATE SCH PUFF: 108 AEROSOL, METERED RESPIRATORY (INHALATION) at 21:23

## 2019-11-13 RX ADMIN — APIXABAN SCH MG: 5 TABLET, FILM COATED ORAL at 17:08

## 2019-11-13 RX ADMIN — CARBIDOPA AND LEVODOPA SCH TAB: 25; 100 TABLET ORAL at 09:12

## 2019-11-13 RX ADMIN — ATORVASTATIN CALCIUM SCH MG: 80 TABLET, FILM COATED ORAL at 21:27

## 2019-11-13 RX ADMIN — INSULIN ASPART SCH UNITS: 100 INJECTION, SOLUTION INTRAVENOUS; SUBCUTANEOUS at 06:05

## 2019-11-13 RX ADMIN — AMANTADINE HYDROCHLORIDE SCH MG: 100 CAPSULE ORAL at 09:12

## 2019-11-13 RX ADMIN — ALBUTEROL SULFATE SCH PUFF: 108 AEROSOL, METERED RESPIRATORY (INHALATION) at 09:17

## 2019-11-13 RX ADMIN — INSULIN DETEMIR SCH UNITS: 100 INJECTION, SOLUTION SUBCUTANEOUS at 21:45

## 2019-11-13 RX ADMIN — APIXABAN SCH MG: 5 TABLET, FILM COATED ORAL at 09:11

## 2019-11-13 RX ADMIN — DOCUSATE SODIUM SCH MG: 100 CAPSULE, LIQUID FILLED ORAL at 21:00

## 2019-11-13 RX ADMIN — INSULIN ASPART SCH UNITS: 100 INJECTION, SOLUTION INTRAVENOUS; SUBCUTANEOUS at 12:06

## 2019-11-13 NOTE — NUR
NURSE NOTES:

Observed pt asleep in bed; arousable to voice. No signs/symptoms of acute distress noted at 
this time. Will continue plan of care.

## 2019-11-13 NOTE — NEPHROLOGY PROGRESS NOTE
Assessment/Plan


Problem List:  


(1) Renal failure


Assessment:  resolved





(2) Diabetes


(3) COPD exacerbation


(4) Syncope


(5) Hypotension


Assessment


Acute renal failure- Cr 2.6 on presentation now normalized


Syncope


HypoThyroidism


mild Anemia


Parkinsons


DM OOC


COPD 


Depression


HTN


CAD


LL Atx vs Infilt


Plan





cortisol level-


add Starlix for DM


monitor renal parameters


slow hydrate


anemia briggs


optimize cardiac and pulmonary status


per orders / Consultants





Subjective


ROS Limited/Unobtainable:  No


Constitutional:  Reports: malaise





Objective


Objective





Last 24 Hour Vital Signs








  Date Time  Temp Pulse Resp B/P (MAP) Pulse Ox O2 Delivery O2 Flow Rate FiO2


 


11/13/19 14:13  89  113/77    


 


11/13/19 09:18  94 16  96 Room Air  21


 


11/13/19 08:00  106      


 


11/13/19 08:00 97.5 102 18 117/68 (84) 97   


 


11/13/19 06:00  97      





  101      





  84      


 


11/13/19 04:00 97.6 97 19 110/74 (86) 95   


 


11/13/19 04:00  82      


 


11/13/19 00:00 98.0 92 18 102/64 (77) 94   


 


11/13/19 00:00  92      





  91      





  110      


 


11/13/19 00:00  99      


 


11/12/19 21:42  76 18  95 Room Air  21


 


11/12/19 21:42  90 18  95 Room Air  21


 


11/12/19 21:00      Room Air  


 


11/12/19 20:00  78      


 


11/12/19 20:00 97.7 87 19 120/75 (90) 96   


 


11/12/19 18:00  67      





  81      





  83      


 


11/12/19 16:00  85      


 


11/12/19 16:00 97.7 61 20 121/86 (98) 95   

















Intake and Output  


 


 11/12/19 11/13/19





 19:00 07:00


 


Intake Total 380 ml 540 ml


 


Output Total 300 ml 1050 ml


 


Balance 80 ml -510 ml


 


  


 


Intake Oral 280 ml 540 ml


 


IV Total 100 ml 


 


Output Urine Total 300 ml 1050 ml


 


# Voids 3 2








Laboratory Tests


11/13/19 05:27: 


Sodium Level 145, Potassium Level 3.8, Chloride Level 109H, Carbon Dioxide 

Level 26, Anion Gap 10, Blood Urea Nitrogen 5L, Creatinine 1.0, Estimat 

Glomerular Filtration Rate > 60, Glucose Level 123H, Calcium Level 8.4L, 

Cortisol AM Sample [Pending], Theophylline Level 11.1


Height (Feet):  6


Height (Inches):  0.00


Weight (Pounds):  195


General Appearance:  no apparent distress


Cardiovascular:  tachycardia, arrhythmia


Objective


no change











Cm Farias MD Nov 13, 2019 14:28

## 2019-11-13 NOTE — CARDIAC ELECTROPHYSIOLOGY PN
Assessment/Plan


Assessment/Plan


1. Syncopal episode in a pt with bifascicular block.  Already ruled out for 

myocardial infarction with serial cardiac enzymes.  


 The electrolytes are not suggestive of dehydration.  No arrhythmias  on 

telemetry.  Stress test showed no ischemia. 


  May need EP study as out patient





2. CAD, S/P stent in January 2019. The patient currently denies any chest pain.

  Continue aspirin and


Lipitor and now Toprol 125 daily





3. Paroxysmal atrial fibrillation, in sinus rhythm on Eliquis 5 mg b.i.d. and 

Toprol.





4. Bifascicular block with right bundle-branch block and left anterior 

fascicular block.


5. Parkinson's, on Sinemet.


6. Hyperlipidemia.


7. Mild rhabdomyolysis.  The patient received IV fluid.


8. Renal failure.  Creatinine was 2.6, but followup creatinine has been 1.3 and 

normal.





ATILIO RN





Subjective


Subjective


No CP or SOB. Remained in sinus tachy.





Objective





Last 24 Hour Vital Signs








  Date Time  Temp Pulse Resp B/P (MAP) Pulse Ox O2 Delivery O2 Flow Rate FiO2


 


11/13/19 16:00  87      


 


11/13/19 16:00 98.0 83 18 121/78 (92) 97   


 


11/13/19 14:13  89  113/77    


 


11/13/19 12:10  91      


 


11/13/19 12:05  88      


 


11/13/19 12:00 98.5 85 20 102/68 (79) 96   


 


11/13/19 12:00  87      


 


11/13/19 12:00  82      


 


11/13/19 09:18  94 16  96 Room Air  21


 


11/13/19 08:00  106      


 


11/13/19 08:00 97.5 102 18 117/68 (84) 97   


 


11/13/19 06:00  97      





  101      





  84      


 


11/13/19 04:00 97.6 97 19 110/74 (86) 95   


 


11/13/19 04:00  82      


 


11/13/19 00:00 98.0 92 18 102/64 (77) 94   


 


11/13/19 00:00  92      





  91      





  110      


 


11/13/19 00:00  99      


 


11/12/19 21:42  76 18  95 Room Air  21


 


11/12/19 21:42  90 18  95 Room Air  21


 


11/12/19 21:00      Room Air  


 


11/12/19 20:00  78      


 


11/12/19 20:00 97.7 87 19 120/75 (90) 96   

















Intake and Output  


 


 11/12/19 11/13/19





 19:00 07:00


 


Intake Total 380 ml 540 ml


 


Output Total 300 ml 1050 ml


 


Balance 80 ml -510 ml


 


  


 


Intake Oral 280 ml 540 ml


 


IV Total 100 ml 


 


Output Urine Total 300 ml 1050 ml


 


# Voids 3 2











Laboratory Tests








Test


  11/13/19


05:27


 


Sodium Level


  145 MMOL/L


(136-145)


 


Potassium Level


  3.8 MMOL/L


(3.5-5.1)


 


Chloride Level


  109 MMOL/L


()  H


 


Carbon Dioxide Level


  26 MMOL/L


(21-32)


 


Anion Gap


  10 mmol/L


(5-15)


 


Blood Urea Nitrogen


  5 mg/dL (7-18)


L


 


Creatinine


  1.0 MG/DL


(0.55-1.30)


 


Estimat Glomerular Filtration


Rate > 60 mL/min


(>60)


 


Glucose Level


  123 MG/DL


()  H


 


Calcium Level


  8.4 MG/DL


(8.5-10.1)  L


 


Cortisol AM Sample Pending  


 


Theophylline Level


  11.1 ug/mL


(10-20)








Objective


HEAD AND NECK: No JVD.


LUNGS:  Clear.


CARDIOVASCULAR:  Regular S1 and S2 with no gallop or murmur.


ABDOMEN:  Soft.


EXTREMITIES:  No pitting edema.











Júnior Hook MD Nov 13, 2019 18:29

## 2019-11-13 NOTE — NUR
NURSE NOTES:

Received report from MARYA Boyd. The patient is resting on the bed without acute distress or 
shortness of breath. The patient's bed in the lowest position, call light in reach, and fall 
and aspiration precaution reinforced. IV site intact and patent. Will continue plan of care.

## 2019-11-13 NOTE — NUR
NURSE NOTES:

Received report from MARYA Staples. Patient in bed awake showing no signs of acute distress. 
AOx4. Respiration even non labored on room air. No sob noted. IV noted on L AC 20g 0.45% 
NS@50cc/hr patent and intact. Bed in lowest position, wheels locked and alarm on. call 
button within reach. All needs attended and met. Will continue plan of care.

## 2019-11-13 NOTE — GENERAL PROGRESS NOTE
Assessment/Plan


Problem List:  


(1) Syncope


ICD Codes:  R55 - Syncope and collapse


SNOMED:  126948906


Qualifiers:  


   Qualified Codes:  R55 - Syncope and collapse


(2) Anemia


ICD Codes:  D64.9 - Anemia, unspecified


SNOMED:  826785892


(3) Atrial fibrillation


ICD Codes:  I48.91 - Unspecified atrial fibrillation


SNOMED:  09954068


(4) Uncontrolled diabetes mellitus with hyperglycemia


ICD Codes:  E11.65 - Type 2 diabetes mellitus with hyperglycemia


SNOMED:  21407716


(5) HTN (hypertension)


ICD Codes:  I10 - Essential (primary) hypertension


SNOMED:  79108609


(6) COPD (chronic obstructive pulmonary disease)


ICD Codes:  J44.9 - Chronic obstructive pulmonary disease, unspecified


SNOMED:  13921319


(7) Elevated lactic acid level


ICD Codes:  R79.89 - Other specified abnormal findings of blood chemistry


SNOMED:  9336262


(8) Moderate protein-calorie malnutrition


ICD Codes:  E44.0 - Moderate protein-calorie malnutrition


SNOMED:  295000410


Status:  stable


Assessment/Plan:


63 y/o male admitted to the Hospital with:





#Syncope


- Etiology include dehydration vs orthostatic hypotension vs underlying cardiac 

etiology vs anemia vs metabolic from uncontrolled DM and autonomic dysfunction 


- Continue monitoring on tele


-Stress test negative for active ischemia, reduced EF noted 


- TTE reviewed 


- Troponin follow up negative.


- Cardiology consult called to Dr. Hook. 


- Hypotension responded well to hydration, resume home Metoprol succinate xl, 

reduce dose to 12.5 mg daily 


-Cortisol AM sent- f/u 





# Acute renal failure- resolved 


- Etiology include pre renal due to dehydration vs medication induced.


- Holding  Metformin and  ARB. Will continue to hold ARB due to borderline BP. 

Metformin held, while in the hospital 


- Renal consultation requested with Dr. Mayo


- Avoid nephrotoxins








# Mild Rhabdomyolysis


-  on admission


- Trend CK in am post IVF





#CAD


#HTN


#HLD


#PAF 


# Bifascicular EKG block with NSR LAFB and RBBB


#Systolic chf 


- S/p PCI and stents in 1/19 by Dr. Bird. Sees Dr. Ferrer outpatient 


- Apixaban 


-Statin 


-Resume metoprolol succinate at reduced dose of 12.5 mg daily 


-Hold Acei/ARB 


- Follow up cardiology consult


-monitor and replace electrolytes 





#Uncontrolled DM (HbA1c 11.8)


-Hold Metformin 


-Continue Nateglinide  120 mg po before meals 


-Lantus 6 units 


-Insulin sliding scale


-Endocrine consult 








# High TSH, Normal T4, low T3, ? Subclinical hypothyroidism vs. Lab error 


- Endocrinology consult- Dr. Salazar- repeat TSH 3.7 , no further testing 

indicated 





#COPD/Asthma- stable


continue home Theophylline. Levels checked  





#Moderate protein calorie malnutrition 


-Nutrition consult 


-DC with home health 





# Generalized weakness


- PT evaluation for dc planning 





# Constipation


-Bowel regimen





# Abnormal CXR with LL lobe atelectasis vs infiltrate


- No signs of acute pneumonia without cough, sputum, leukocytosis or fever. 


- Monitor and HOLD antibiotics for now


- Lactate was elevated on admission 3.9 and repeat was 2 - most likely due to 

syncopal event/dehydration 





# DVT ppx


- Apixaban therapy. will continue at 5 mg BID since renal function improved 

after IVF


# GI ppx


- PPI


# FULL CODE


#Diet: fluid restriction to 1.5 l per day 





I spent 40 minutes during this encounter. > 50% spent on counselling and care 

coordination. I spent an additional 31 minutes in chart review. d/w patient, rn

, consultants (endocrine and cardiology)





Subjective


Date patient seen:  Nov 13, 2019


ROS Limited/Unobtainable:  No


Constitutional:  Denies: no symptoms, chills, diaphoresis, fever, malaise, 

weakness, other


HEENT:  Denies: no symptoms, eye pain, blurred vision, tearing, double vision, 

ear pain, ear discharge, nose pain, nose congestion, throat pain, throat 

swelling, mouth pain, mouth swelling, other


Cardiovascular:  Denies: no symptoms, chest pain, edema, irregular heart rate, 

lightheadedness, palpitations, syncope, other


Respiratory:  Denies: no symptoms, cough, orthopnea, shortness of breath, SOB 

with excertion, SOB at rest, sputum, stridor, wheezing, other


Gastrointestinal/Abdominal:  Denies: no symptoms, abdomen distended, abdominal 

pain, black stools, tarry stools, blood in stool, constipated, diarrhea, 

difficulty swallowing, nausea, poor appetite, poor fluid intake, rectal bleeding

, vomiting, other


Genitourinary:  Denies: no symptoms, burning, discharge, frequency, flank pain, 

hematuria, incontinence, pain, urgency, other


Neurologic/Psychiatric:  Denies: no symptoms, anxiety, depressed, emotional 

problems, headache, numbness, paresthesia, pre-existing deficit, seizure, 

tingling, tremors, weakness, other


Endocrine:  Denies: no symptoms, excessive sweating, flushing, intolerance to 

cold, intolerance to heat, increased hunger, increased thirst, increased urine, 

unexplained weight gain, unexplained weight loss, other


Hematologic/Lymphatic:  Denies: no symptoms, anemia, easy bleeding, easy 

bruising, other


Allergies:  


Coded Allergies:  


     SHARK LIVER OIL (Unverified  Allergy, Unknown, 8/6/16)


Uncoded Allergies:  


     SHARK (Allergy, Mild, RASH, 4/11/16)


Subjective


Bp better after hydration. deies cp, sob, palpitations, syncope. tele 

remarkable for sinus tachycardia up to 130's. stress test non ischemic.





Objective





Last 24 Hour Vital Signs








  Date Time  Temp Pulse Resp B/P (MAP) Pulse Ox O2 Delivery O2 Flow Rate FiO2


 


11/13/19 09:18  94 16  96 Room Air  21


 


11/13/19 08:00 97.5 102 18 117/68 (84) 97   


 


11/13/19 06:00  97      





  101      





  84      


 


11/13/19 04:00 97.6 97 19 110/74 (86) 95   


 


11/13/19 04:00  82      


 


11/13/19 00:00 98.0 92 18 102/64 (77) 94   


 


11/13/19 00:00  92      





  91      





  110      


 


11/13/19 00:00  99      


 


11/12/19 21:42  76 18  95 Room Air  21


 


11/12/19 21:42  90 18  95 Room Air  21


 


11/12/19 21:00      Room Air  


 


11/12/19 20:00  78      


 


11/12/19 20:00 97.7 87 19 120/75 (90) 96   


 


11/12/19 18:00  67      





  81      





  83      


 


11/12/19 16:00  85      


 


11/12/19 16:00 97.7 61 20 121/86 (98) 95   


 


11/12/19 12:00  95      


 


11/12/19 12:00 97.7 81 18 127/61 (83) 96   


 


11/12/19 12:00  81      





  101      





  106      

















Intake and Output  


 


 11/12/19 11/13/19





 19:00 07:00


 


Intake Total 380 ml 540 ml


 


Output Total 300 ml 1050 ml


 


Balance 80 ml -510 ml


 


  


 


Intake Oral 280 ml 540 ml


 


IV Total 100 ml 


 


Output Urine Total 300 ml 1050 ml


 


# Voids 3 2








Laboratory Tests


11/13/19 05:27: 


Sodium Level 145, Potassium Level 3.8, Chloride Level 109H, Carbon Dioxide 

Level 26, Anion Gap 10, Blood Urea Nitrogen 5L, Creatinine 1.0, Estimat 

Glomerular Filtration Rate > 60, Glucose Level 123H, Calcium Level 8.4L


Height (Feet):  6


Height (Inches):  0.00


Weight (Pounds):  195


Objective


General Appearance:  WD/WN, no apparent distress


Lines, tubes and drains:  peripheral


HEENT:  normocephalic, atraumatic


Neck:  non-tender, normal alignment


Respiratory/Chest:  lungs clear, normal breath sounds


Cardiovascular/Chest:  normal rate, regular rhythm, no JVD


Abdomen:  normal bowel sounds, soft


Extremities:  normal range of motion


Skin Exam:  normal pigmentation


Neurologic:  CNs II-XII grossly normal, alert, oriented x 3











Andres uDran M.D. Nov 13, 2019 10:36

## 2019-11-13 NOTE — NUR
CASE MANAGEMENT:REVIEW



11/13/19

SI:SYNCOPE. AFIB. UNCONTROLLED DM

***HR NOT CONTROLLED...BP DROPPED LAST NOC /63

97.5   102  18  117/68   97% ON RA

H/H-10.1/30.3   



IS: TOPROL XL PO QD

STARLIX PO TID

AMANTADINE PO QD

ASA PO QD

ELIQUIS PO BID

NEURONTIN PO TID

ALBUTEROL INH Q12

KHURRAM-DUR PO Q12

**: TELEMETRY STATUS

DCP: FROM HOME



PLAN:

STRESS TEST NON ISCHEMIC

CONTROL HEART RATE WITHOUT COMPROMISING BLOOD PRESSURE

## 2019-11-13 NOTE — GENERAL PROGRESS NOTE
Assessment/Plan


Problem List:  


(1) Hypothyroidism


ICD Codes:  E03.9 - Hypothyroidism, unspecified


SNOMED:  08117600


Qualifiers:  


   Qualified Codes:  E03.9 - Hypothyroidism, unspecified


(2) Insulin dependent type 2 diabetes mellitus


ICD Codes:  E11.9 - Type 2 diabetes mellitus without complications; Z79.4 - 

Long term (current) use of insulin


SNOMED:  829464781


(3) COPD (chronic obstructive pulmonary disease)


ICD Codes:  J44.9 - Chronic obstructive pulmonary disease, unspecified


SNOMED:  51560254


(4) Uncontrolled diabetes mellitus with hyperglycemia


ICD Codes:  E11.65 - Type 2 diabetes mellitus with hyperglycemia


SNOMED:  99148231


Status:  stable


Assessment/Plan:


increase Levemir to 10 units qhs 


continue Starlix 120 mg ac tid 


continue NISS ac / hs





Subjective


Allergies:  


Coded Allergies:  


     SHARK LIVER OIL (Unverified  Allergy, Unknown, 8/6/16)


Uncoded Allergies:  


     SHARK (Allergy, Mild, RASH, 4/11/16)


All Systems:  reviewed and negative except above


Subjective


events noted 











Item Value  Date Time


 


Bedside Blood Glucose 179 mg/dl H 11/13/19 1206


 


Bedside Blood Glucose 136 mg/dl H 11/13/19 0630


 


Bedside Blood Glucose 192 mg/dl H 11/12/19 2106


 


Bedside Blood Glucose 187 mg/dl H 11/12/19 1659


 


Bedside Blood Glucose 180 mg/dl H 11/12/19 1220











Objective





Last 24 Hour Vital Signs








  Date Time  Temp Pulse Resp B/P (MAP) Pulse Ox O2 Delivery O2 Flow Rate FiO2


 


11/13/19 09:18  94 16  96 Room Air  21


 


11/13/19 08:00  106      


 


11/13/19 08:00 97.5 102 18 117/68 (84) 97   


 


11/13/19 06:00  97      





  101      





  84      


 


11/13/19 04:00 97.6 97 19 110/74 (86) 95   


 


11/13/19 04:00  82      


 


11/13/19 00:00 98.0 92 18 102/64 (77) 94   


 


11/13/19 00:00  92      





  91      





  110      


 


11/13/19 00:00  99      


 


11/12/19 21:42  76 18  95 Room Air  21


 


11/12/19 21:42  90 18  95 Room Air  21


 


11/12/19 21:00      Room Air  


 


11/12/19 20:00  78      


 


11/12/19 20:00 97.7 87 19 120/75 (90) 96   


 


11/12/19 18:00  67      





  81      





  83      


 


11/12/19 16:00  85      


 


11/12/19 16:00 97.7 61 20 121/86 (98) 95   

















Intake and Output  


 


 11/12/19 11/13/19





 19:00 07:00


 


Intake Total 380 ml 540 ml


 


Output Total 300 ml 1050 ml


 


Balance 80 ml -510 ml


 


  


 


Intake Oral 280 ml 540 ml


 


IV Total 100 ml 


 


Output Urine Total 300 ml 1050 ml


 


# Voids 3 2








Laboratory Tests


11/13/19 05:27: 


Sodium Level 145, Potassium Level 3.8, Chloride Level 109H, Carbon Dioxide 

Level 26, Anion Gap 10, Blood Urea Nitrogen 5L, Creatinine 1.0, Estimat 

Glomerular Filtration Rate > 60, Glucose Level 123H, Calcium Level 8.4L, 

Cortisol AM Sample [Pending], Theophylline Level 11.1


Height (Feet):  6


Height (Inches):  0.00


Weight (Pounds):  195


General Appearance:  no apparent distress


Neck:  normal alignment


Cardiovascular:  normal rate


Respiratory/Chest:  lungs clear


Abdomen:  normal bowel sounds


Pelvis:  normal external exam


Objective





Current Medications








 Medications


  (Trade)  Dose


 Ordered  Sig/Amina


 Route


 PRN Reason  Start Time


 Stop Time Status Last Admin


Dose Admin


 


 Albuterol Sulfate


  (Proventil MDI)  2 puff  Q12HR


 INH


   11/10/19 21:00


 12/9/19 20:59  11/13/19 09:17


 


 


 Albuterol/


 Ipratropium


  (Albuterol/


 Ipratropium)  3 ml  Q4H  PRN


 HHN


 Shortness of Breath  11/10/19 18:00


 11/14/19 17:59   


 


 


 Amantadine HCl


  (Symmetrel)  100 mg  DAILY


 ORAL


   11/11/19 09:00


 12/10/19 08:59  11/13/19 09:12


 


 


 Apixaban


  (Eliquis)  5 mg  BID


 ORAL


   11/11/19 09:00


 12/11/19 08:59  11/13/19 09:11


 


 


 Aspirin


  (ASA)  81 mg  DAILY


 ORAL


   11/11/19 09:00


 12/10/19 08:59  11/13/19 09:11


 


 


 Atorvastatin


 Calcium


  (Lipitor)  80 mg  BEDTIME


 ORAL


   11/10/19 21:00


 12/9/19 20:59  11/12/19 21:04


 


 


 Carbidopa/Levodopa


  (Sinemet 25/100)  1 tab  THREE TIMES A  DAY


 ORAL


   11/11/19 09:00


 12/11/19 08:59  11/13/19 12:07


 


 


 Dextrose


  (Dextrose 50%)  25 ml  Q30M  PRN


 IV


 Hypoglycemia  11/12/19 18:30


 12/12/19 18:29   


 


 


 Dextrose


  (Dextrose 50%)  50 ml  Q30M  PRN


 IV


 Hypoglycemia  11/12/19 18:30


 12/12/19 18:29   


 


 


 Docusate Sodium


  (Colace)  100 mg  EVERY 12  HOURS


 ORAL


   11/10/19 21:00


 12/9/19 20:59  11/13/19 09:11


 


 


 Famotidine


  (Pepcid)  40 mg  DAILY


 ORAL


   11/11/19 09:00


 12/10/19 08:59  11/13/19 09:12


 


 


 Gabapentin


  (Neurontin)  600 mg  Q8HR


 ORAL


   11/12/19 14:00


 12/12/19 13:59  11/13/19 06:03


 


 


 Hydralazine HCl


  (Apresoline)  10 mg  Q6H  PRN


 IV


 For High Blood Pressure  11/10/19 18:00


 12/9/19 17:59   


 


 


 Insulin Aspart


  (NovoLOG)    BEFORE MEALS AND  HS


 SUBQ


   11/10/19 21:00


 12/9/19 20:59  11/13/19 12:06


 


 


 Insulin Detemir


  (Levemir)  6 units  BEDTIME


 SUBQ


   11/12/19 21:00


 12/12/19 20:59  11/12/19 21:06


 


 


 Metoprolol


 Succinate


  (Toprol XL)  12.5 mg  DAILY


 ORAL


   11/13/19 13:41


 12/13/19 13:40   


 


 


 Nateglinide


  (Starlix)  120 mg  TIAC


 ORAL


   11/12/19 16:30


 12/11/19 11:29  11/13/19 12:05


 


 


 Nitroglycerin


  (Ntg)  0.4 mg  Q5MIN X 3 DOSES PRN


 SL


 CHEST PAIN  11/10/19 18:00


 12/9/19 16:44   


 


 


 Ondansetron HCl


  (Zofran)  4 mg  Q6H  PRN


 IVP


 Nausea & Vomiting  11/10/19 18:00


 12/9/19 17:59   


 


 


 Polyethylene


 Glycol


  (Miralax)  17 gm  DAILYPRN  PRN


 ORAL


 Constipation  11/10/19 18:00


 12/10/19 17:59   


 


 


 Sodium Chloride  1,000 ml @ 


 50 mls/hr  Q20H


 IV


   11/10/19 18:00


 12/9/19 14:59  11/13/19 06:03


 


 


 Tamsulosin HCl


  (Flomax)  0.8 mg  BEDTIME


 ORAL


   11/10/19 21:00


 12/9/19 20:59  11/12/19 21:03


 


 


 Theophylline


  (Shreyas-Dur)  300 mg  Q12HR


 ORAL


   11/10/19 21:00


 12/10/19 10:44  11/13/19 09:12


 

















Mitch Salazar MD Nov 13, 2019 14:01

## 2019-11-13 NOTE — NUR
HAND-OFF: 

Report given to MARYA Staples. Pt is in stable condition eating breakfast in bed. Plan of care 
endorsed.

## 2019-11-13 NOTE — NUR
NURSE NOTES:

Dr. Duran at the bedside assessed the patient. The patient is stable without acute distress 
or shortness of breath. Notified Dr. Duran regarding the patient's tachycardia. Dr. Duran 
ordered medication. Will continue plan of care.

## 2019-11-13 NOTE — NUR
HAND-OFF: 

Report given to MARYA Juarez. The patient is resting on the bed without acute distress or 
shortness of breath. The patient's bed in the lowest position, call light in reach, and fall 
and aspiration precaution reinforced. IV site intact and patent. Endorsed plan of care.

## 2019-11-14 VITALS — SYSTOLIC BLOOD PRESSURE: 100 MMHG | DIASTOLIC BLOOD PRESSURE: 56 MMHG

## 2019-11-14 VITALS — DIASTOLIC BLOOD PRESSURE: 65 MMHG | SYSTOLIC BLOOD PRESSURE: 102 MMHG

## 2019-11-14 VITALS — SYSTOLIC BLOOD PRESSURE: 100 MMHG | DIASTOLIC BLOOD PRESSURE: 51 MMHG

## 2019-11-14 VITALS — DIASTOLIC BLOOD PRESSURE: 62 MMHG | SYSTOLIC BLOOD PRESSURE: 113 MMHG

## 2019-11-14 RX ADMIN — APIXABAN SCH MG: 5 TABLET, FILM COATED ORAL at 08:52

## 2019-11-14 RX ADMIN — INSULIN ASPART SCH UNITS: 100 INJECTION, SOLUTION INTRAVENOUS; SUBCUTANEOUS at 12:08

## 2019-11-14 RX ADMIN — IPRATROPIUM BROMIDE AND ALBUTEROL SULFATE PRN ML: .5; 3 SOLUTION RESPIRATORY (INHALATION) at 08:04

## 2019-11-14 RX ADMIN — THEOPHYLLINE ANHYDROUS SCH MG: 100 CAPSULE, EXTENDED RELEASE ORAL at 08:52

## 2019-11-14 RX ADMIN — IPRATROPIUM BROMIDE AND ALBUTEROL SULFATE PRN ML: .5; 3 SOLUTION RESPIRATORY (INHALATION) at 01:38

## 2019-11-14 RX ADMIN — AMANTADINE HYDROCHLORIDE SCH MG: 100 CAPSULE ORAL at 08:53

## 2019-11-14 RX ADMIN — DOCUSATE SODIUM SCH MG: 100 CAPSULE, LIQUID FILLED ORAL at 08:54

## 2019-11-14 RX ADMIN — ASPIRIN 81 MG SCH MG: 81 TABLET ORAL at 08:52

## 2019-11-14 RX ADMIN — INSULIN ASPART SCH UNITS: 100 INJECTION, SOLUTION INTRAVENOUS; SUBCUTANEOUS at 05:37

## 2019-11-14 RX ADMIN — CARBIDOPA AND LEVODOPA SCH TAB: 25; 100 TABLET ORAL at 08:52

## 2019-11-14 RX ADMIN — ALBUTEROL SULFATE SCH PUFF: 108 AEROSOL, METERED RESPIRATORY (INHALATION) at 08:12

## 2019-11-14 RX ADMIN — METOPROLOL SUCCINATE SCH MG: 25 TABLET, EXTENDED RELEASE ORAL at 08:53

## 2019-11-14 RX ADMIN — CARBIDOPA AND LEVODOPA SCH TAB: 25; 100 TABLET ORAL at 12:05

## 2019-11-14 NOTE — GENERAL PROGRESS NOTE
Assessment/Plan


Problem List:  


(1) Hypothyroidism


ICD Codes:  E03.9 - Hypothyroidism, unspecified


SNOMED:  62028663


Qualifiers:  


   Qualified Codes:  E03.9 - Hypothyroidism, unspecified


(2) Insulin dependent type 2 diabetes mellitus


ICD Codes:  E11.9 - Type 2 diabetes mellitus without complications; Z79.4 - 

Long term (current) use of insulin


SNOMED:  759076224


(3) COPD (chronic obstructive pulmonary disease)


ICD Codes:  J44.9 - Chronic obstructive pulmonary disease, unspecified


SNOMED:  46192665


(4) Uncontrolled diabetes mellitus with hyperglycemia


ICD Codes:  E11.65 - Type 2 diabetes mellitus with hyperglycemia


SNOMED:  97558479


Status:  stable


Assessment/Plan:


continue Levemir 10 units qhs 


continue Starlix 120 mg ac tid 


continue NISS ac / hs





Subjective


Allergies:  


Coded Allergies:  


     SHARK LIVER OIL (Unverified  Allergy, Unknown, 8/6/16)


Uncoded Allergies:  


     SHARK (Allergy, Mild, RASH, 4/11/16)


All Systems:  reviewed and negative except above


Subjective


events noted 


glucose values are stable 














Item Value  Date Time


 


Bedside Blood Glucose 123 mg/dl H 11/14/19 0537


 


Bedside Blood Glucose 186 mg/dl H 11/13/19 2146


 


Bedside Blood Glucose 198 mg/dl H 11/13/19 1708


 


Bedside Blood Glucose 179 mg/dl H 11/13/19 1206


 


Bedside Blood Glucose 136 mg/dl H 11/13/19 0630











Objective





Last 24 Hour Vital Signs








  Date Time  Temp Pulse Resp B/P (MAP) Pulse Ox O2 Delivery O2 Flow Rate FiO2


 


11/14/19 06:00  91      


 


11/14/19 05:55  88      


 


11/14/19 05:50  86      


 


11/14/19 04:00 98.4 87 18 100/56 (71) 95   


 


11/14/19 04:00  81      


 


11/14/19 01:39  84 18  99 Room Air  21





  79 18  90   


 


11/14/19 00:10  84      


 


11/14/19 00:05  85      


 


11/14/19 00:00 98.7 86 18 100/51 (67) 94   


 


11/14/19 00:00  78      


 


11/14/19 00:00  86      


 


11/13/19 21:24  84 18  94 Room Air  21


 


11/13/19 21:23  84 18  92 Room Air  21


 


11/13/19 21:00      Room Air  


 


11/13/19 20:00  100      


 


11/13/19 20:00 98.4 89 18 115/73 (87) 94   


 


11/13/19 19:22  86 18  98 Room Air  21





  84 18  91   


 


11/13/19 18:10  100      


 


11/13/19 18:05  89      


 


11/13/19 18:00  85      


 


11/13/19 16:00  87      


 


11/13/19 16:00 98.0 83 18 121/78 (92) 97   


 


11/13/19 14:13  89  113/77    


 


11/13/19 12:10  91      


 


11/13/19 12:05  88      


 


11/13/19 12:00 98.5 85 20 102/68 (79) 96   


 


11/13/19 12:00  87      


 


11/13/19 12:00  82      


 


11/13/19 09:18  94 16  96 Room Air  21


 


11/13/19 08:00  106      


 


11/13/19 08:00 97.5 102 18 117/68 (84) 97   

















Intake and Output  


 


 11/13/19 11/14/19





 18:59 06:59


 


Intake Total 600 ml 


 


Output Total 1100 ml 


 


Balance -500 ml 


 


  


 


Intake Oral 600 ml 


 


Output Urine Total 1100 ml 








Height (Feet):  6


Height (Inches):  0.00


Weight (Pounds):  195


General Appearance:  no apparent distress


Neck:  normal alignment


Cardiovascular:  normal rate


Respiratory/Chest:  lungs clear


Abdomen:  normal bowel sounds


Pelvis:  normal external exam


Objective





Current Medications








 Medications


  (Trade)  Dose


 Ordered  Sig/Amina


 Route


 PRN Reason  Start Time


 Stop Time Status Last Admin


Dose Admin


 


 Albuterol Sulfate


  (Proventil MDI)  2 puff  Q12HR


 INH


   11/10/19 21:00


 12/9/19 20:59  11/13/19 21:23


 


 


 Albuterol/


 Ipratropium


  (Albuterol/


 Ipratropium)  3 ml  Q4H  PRN


 HHN


 Shortness of Breath  11/10/19 18:00


 11/14/19 17:59  11/14/19 01:38


 


 


 Amantadine HCl


  (Symmetrel)  100 mg  DAILY


 ORAL


   11/11/19 09:00


 12/10/19 08:59  11/13/19 09:12


 


 


 Apixaban


  (Eliquis)  5 mg  BID


 ORAL


   11/11/19 09:00


 12/11/19 08:59  11/13/19 17:08


 


 


 Aspirin


  (ASA)  81 mg  DAILY


 ORAL


   11/11/19 09:00


 12/10/19 08:59  11/13/19 09:11


 


 


 Atorvastatin


 Calcium


  (Lipitor)  80 mg  BEDTIME


 ORAL


   11/10/19 21:00


 12/9/19 20:59  11/13/19 21:27


 


 


 Carbidopa/Levodopa


  (Sinemet 25/100)  1 tab  THREE TIMES A  DAY


 ORAL


   11/11/19 09:00


 12/11/19 08:59  11/13/19 17:08


 


 


 Dextrose


  (Dextrose 50%)  25 ml  Q30M  PRN


 IV


 Hypoglycemia  11/12/19 18:30


 12/12/19 18:29   


 


 


 Dextrose


  (Dextrose 50%)  50 ml  Q30M  PRN


 IV


 Hypoglycemia  11/12/19 18:30


 12/12/19 18:29   


 


 


 Docusate Sodium


  (Colace)  100 mg  EVERY 12  HOURS


 ORAL


   11/10/19 21:00


 12/9/19 20:59  11/13/19 09:11


 


 


 Famotidine


  (Pepcid)  40 mg  DAILY


 ORAL


   11/11/19 09:00


 12/10/19 08:59  11/13/19 09:12


 


 


 Gabapentin


  (Neurontin)  600 mg  Q8HR


 ORAL


   11/12/19 14:00


 12/12/19 13:59  11/14/19 05:37


 


 


 Hydralazine HCl


  (Apresoline)  10 mg  Q6H  PRN


 IV


 For High Blood Pressure  11/10/19 18:00


 12/9/19 17:59   


 


 


 Insulin Aspart


  (NovoLOG)    BEFORE MEALS AND  HS


 SUBQ


   11/10/19 21:00


 12/9/19 20:59  11/14/19 05:37


 


 


 Insulin Detemir


  (Levemir)  6 units  BEDTIME


 SUBQ


   11/12/19 21:00


 12/12/19 20:59  11/13/19 21:45


 


 


 Metoprolol


 Succinate


  (Toprol XL)  12.5 mg  DAILY


 ORAL


   11/13/19 13:41


 12/13/19 13:40  11/13/19 14:13


 


 


 Nateglinide


  (Starlix)  120 mg  TIAC


 ORAL


   11/12/19 16:30


 12/11/19 11:29  11/14/19 05:37


 


 


 Nitroglycerin


  (Ntg)  0.4 mg  Q5MIN X 3 DOSES PRN


 SL


 CHEST PAIN  11/10/19 18:00


 12/9/19 16:44   


 


 


 Ondansetron HCl


  (Zofran)  4 mg  Q6H  PRN


 IVP


 Nausea & Vomiting  11/10/19 18:00


 12/9/19 17:59   


 


 


 Polyethylene


 Glycol


  (Miralax)  17 gm  DAILYPRN  PRN


 ORAL


 Constipation  11/10/19 18:00


 12/10/19 17:59   


 


 


 Sodium Chloride  1,000 ml @ 


 50 mls/hr  Q20H


 IV


   11/10/19 18:00


 12/9/19 14:59  11/14/19 02:31


 


 


 Tamsulosin HCl


  (Flomax)  0.8 mg  BEDTIME


 ORAL


   11/10/19 21:00


 12/9/19 20:59  11/13/19 21:26


 


 


 Theophylline


  (Shreyas-Dur)  300 mg  Q12HR


 ORAL


   11/10/19 21:00


 12/10/19 10:44  11/13/19 21:26


 

















Mitch Salazar MD Nov 14, 2019 06:26

## 2019-11-14 NOTE — NUR
NURSE NOTES:

Received report from Larry/RN, Patient is awake, eating breakfast on bed. On room air, no 
acute distress/SOB noted, denies pain at this time. Able to make needs known. IV on left AC 
patent, no bleeding or infiltration noted. Bed in low position and locked, Bed alarm 
engaged, Side-rails up x3. Encouraged to use call light when needed. Call light within 
reach. will continue to monitor.

## 2019-11-14 NOTE — DISCHARGE SUMMARY
Discharge Summary


Hospital Course


Date of Admission


Nov 9, 2019 at 13:20


Date of Discharge


11/14/2019


Admitting Diagnosis


Syncope


HPI


Angelo Fernandes is a 64 year old male who was admitted on Nov 9, 2019 at 

13:20 for Syncope


Consultations


Cardiology


Endocrinology


Nephrology


Procedures


Dobutamine stress test


Hospital Course


Patient seen and examined. Sitting in chair. Denies cp, sob, palpitations, or 

dizziness. On exam. His alert, awake and oriented x3. lungs cta bilaterally, CVS

: S1S2, ext: No edema 





63 y/o male admitted to the Hospital with:





#Syncope


- Etiology include dehydration vs orthostatic hypotension vs underlying cardiac 

etiology vs anemia vs metabolic from uncontrolled DM and autonomic dysfunction 


- Monitored on telemetry- no evidence of ventricular arrhythmias 


-Stress test negative for active ischemia, reduced EF noted 


- Troponin follow up negative.


- Cardiology consult called to Dr. Hook. 


- Hypotension responded well to hydration, resume home Metoprol succinate xl, 

reduce dose to 12.5 mg daily 


-Cortisol AM- wnl 





# Acute renal failure- resolved 


- Etiology include pre renal due to dehydration vs medication induced.


- Holding  Metformin and  ARB. Will continue to hold ARB due to borderline BP. 

Metformin held 


- Renal consultation requested with Dr. Mayo


- Avoid nephrotoxins








# Mild Rhabdomyolysis


-  on admission. Resolved 





#CAD


#HTN


#HLD


#PAF 


# Bifascicular EKG block with NSR LAFB and RBBB


#Systolic chf 


- S/p PCI and stents in 1/19 by Dr. Bird. Sees Dr. Ferrer outpatient 


- Apixaban 


-Statin 


-Resume metoprolol succinate at reduced dose of 12.5 mg daily 


-Hold Acei/ARB due to borderline low bp 


- Follow up cardiology outpatient 


-monitor and replace electrolytes 





#Uncontrolled DM (HbA1c 11.8)


-Hold Metformin 


-Nateglinide  120 mg po before meals 


-Levemir 6 units, based on sliding scale coverage use. Will Discharge on 

Levemir 8 units BID. 


-Insulin sliding scale


-Endocrine consult 








# High TSH, Normal T4, low T3, ? Subclinical hypothyroidism vs. Lab error 


- Endocrinology consult- Dr. Salazar- repeat TSH 3.7 , no further testing 

indicated 





#COPD/Asthma- stable


continue home Theophylline. Levels checked therapeutic and not elevated  





#Moderate protein calorie malnutrition 


-Nutrition consult 


-DC with home health- [facility] 





# Generalized weakness


- PT evaluation for dc planning 





# Constipation


-Bowel regimen





# Abnormal CXR with LL lobe atelectasis vs infiltrate


- No signs of acute pneumonia without cough, sputum, leukocytosis or fever. 


- Monitor and HOLD antibiotics for now


- Lactate was elevated on admission 3.9 and repeat was 2 - most likely due to 

syncopal event/dehydration 





# DVT ppx


- Apixaban therapy. will continue at 5 mg BID since renal function improved 

after IVF


# GI ppx


- PPI


# FULL CODE


#Diet: fluid restriction to 1.5 l per day 


#Disposition: Home with home health 








I spent 35 minutes during this encounter. > 50% spent on counselling and care 

coordination. d/w cardiology, d/w pcp Dr. Hernandez





Discharge Medications


New Medications:  


Insulin Detemir (Levemir Flexpen) 100 Unit/1 Ml Insuln.pen


8 UNITS SUBQ Q12HR for 10 Days, #1 EA





Nateglinide (Starlix) 120 Mg Tablet


120 MG ORAL TIAC for 30 Days, #90 TAB





 


Changed Medications:  


Metoprolol Succinate* (Metoprolol Succinate*) 25 Mg Tab.er.24h


12.5 MG ORAL DAILY for 30 Days, #30 TAB (Changed from: 25 MG)





 


Continued Medications:  


Albuterol Sulfate (Ventolin Hfa) 18 Gm Hfa.aer.ad


2 PUFFS INH EVERY 4 HOURS PRN for Shortness of Breath





Amantadine Hcl* (Symmetrel*) 100 Mg Capsule


100 MG ORAL DAILY





Apixaban (Eliquis) 5 Mg Tablet


5 MG PO TWICE A DAY, TAB





Aspirin* (Aspirin*) 81 Mg Tab.chew


81 MG ORAL DAILY, TAB





Atorvastatin Calcium* (Lipitor*) 80 Mg Tablet


80 MG ORAL BEDTIME, TAB





Carbidopa/Levodopa  Mg* (Sinemet  Mg Tablet*) 1 Each Tablet


1 TAB ORAL THREE TIMES A DAY, TAB





Clotrimazole* (Lotrimin*) 15 Gm Cream..g.


1 APPLIC TOPIC TWICE A DAY, GM





Dutasteride (Avodart) 0.5 Mg Capsule


0.5 MG ORAL QHS





Ergocalciferol (Vitamin D2)* (Vitamin D*) 50,000 Unit Capsule


34814 UNIT ORAL ONCE A WEEK, CAP





Ferrous Sulfate* (Ferrous Sulfate*) 325 Mg Tablet


325 MG ORAL DAILY





Gabapentin* (Gabapentin*) 600 Mg Tablet


600 MG ORAL THREE TIMES A DAY





Hydrocortisone 1% Oint (Hydrocortisone 1% Oint*) Y Oint


TP TWICE A DAY, GM





Insulin Aspart (Novolog Flexpen) 100 Unit/1 Ml Insuln.pen


SQ TID PRN for SLIDING SCALE





Linaclotide (Linzess) 145 Mcg Capsule


145 MCG PO DAILY, CAP





Mirabegron (Myrbetriq) 25 Mg Tab.er.24h


25 MG PO DAILY, TAB





Mirtazapine* (Remeron*) 30 Mg Tablet


45 MG ORAL BEDTIME, TAB





Montelukast Sodium* (Montelukast Sodium*) 10 Mg Tablet


10 MG ORAL DAILY, TAB





Nitroglycerin 0.4MG table* (Nitroglycerin*) 0.4 Mg Tab.subl


0.4 MG SL .Q5MIN X 3 DOSES PRN for CHEST PAIN, TAB





Omeprazole (Omeprazole) 20 Mg Capsule.dr


40 MG ORAL BEDTIME





Ramelteon (Rozerem) 8 Mg Tablet


8 MG PO BEDTIME, TAB





Roflumilast (Daliresp) 500 Mcg Tablet


250 MCG PO TWICE A DAY, TAB





Tamsulosin Hcl (Tamsulosin Hcl*) 0.4 Mg Cap.er.24h


0.8 MG ORAL BEDTIME





Theophylline Anhydrous (Theophylline Anhydrous) 300 Mg Tab.er.12h


300 MG PO TWICE A DAY, TAB





[Trelegy Ellipta] ()  


1 PUFF INH DAILY





Triamcinolone Acetonide (Triamcinolone Acetonide 0.1% Oint*) 15 Gm Oint...g.


0 TP TWICE A DAY, GM





 


Discontinued Medications:  


Fluticasone Propionate (Flonase Allergy Relief) 9.9 Ml [place].susp


9.9 ML NS DAILY





Formoterol Fumarate (Perforomist) Unknown Strength Vial.neb


1 PUFF IH BID, VIAL





Insulin Degludec/Liraglutide (Xultophy 100 Unit-3.6MG/Ml Pen) 3 Ml Insuln.pen


15 UNITS SQ QHS, EA





Lisinopril* (Lisinopril*) 2.5 Mg Tablet


2.5 MG ORAL DAILY for HYPERTENSION, TAB 0 Refills





Losartan Potassium* (Losartan Potassium*) 25 Mg Tablet


25 MG ORAL DAILY, TAB





Metformin Hcl* (Metformin Hcl*) 500 Mg Tablet


500 MG ORAL TWICE A DAY, TAB





Oxycodone Hcl* (Roxicodone*) 30 Mg Tablet


30 MG ORAL Q8HR PRN for Severe Pain (Pain Scale 7-10)











Discharge


Condition Upon Discharge:  stable


Discharge Disposition


Patient was discharged to home with services


Discharge Diagnoses:  


(1) Syncope and collapse


(2) Hypotension


(3) Uncontrolled diabetes mellitus with hyperglycemia


(4) COPD (chronic obstructive pulmonary disease)


(5) Atrial fibrillation


(6) Anemia


(7) Moderate protein-calorie malnutrition











Andres uDran M.D. Nov 14, 2019 10:08

## 2019-11-14 NOTE — NUR
NURSE NOTES:

Discharge instruction given to patient, Verbalized understanding. Medication and belonging 
given to patient. Cardiac monitor and IV removed, No distress, no bleeding. Patient is in 
stable condition. Offered Bus token or taxi voucher, but patient refused because he has 
multiple stops before he gets home. Escorted downstairs by CNA.

## 2023-07-05 NOTE — DIAGNOSTIC IMAGING REPORT
----- Message from [place] sent at 7/5/2023  8:59 AM EDT -----  Regarding: eye exam dm  07/05/23 8:59 AM    Hello, our patient attached above has had dm eye exam  completed/performed.  Please assist in updating the patient chart by [place]ney [place]    Thank you,  Evi Garza  [facility] JERROD BAUTISTA Indication: COPD, shortness of breath, status post breathing treatment



Technique: One view of the chest



Comparison: 8/6/2016



Findings: Lungs are hyperinflated. Acute infiltrates, effusions, or congestion.

Heart size is normal. Aorta is tortuous and ectatic. Upper mediastinum is

unremarkable.  No significant change



Impression: No acute process



Hyperinflation, consistent with COPD

## 2023-10-21 NOTE — DISCHARGE SUMMARY
Discharge Summary


Hospital Course


Date of Admission


Mar 10, 2018 at 18:38


Date of Discharge


Mar 14, 2018 at 11:50


Admitting Diagnosis


copd exacerbation


Reason for Hospitalization:  acute copd exacerbation


HPI


61y/o male with pmh of HTN, COPD, DM2, depression who presents with SOB and 

cough. Pt states he has been having worsening symptoms of cough and SOB for the 

past few weeks. He c/o subjective fever/chills. States he was recently 

hospitalized at another hospital for similar symptoms. He has been taking 

prednisone 5mg daily. He reports increased sputum production. Denies hemoptysis

, n/v, d/c, abd pain, chest pain. Notes increased SOB w/ activity/exertion.





In ED, pt noted to be in some respiratory distress w/ wheezing. He was given 

solumedrol 125mg IV, duonebs w/ some improvement. Trop neg. EKG w/ no acute ST/

T changes. CXR showed no e/o infiltrate.


Consultations


Pulmonology


Hospital Course


Pt was admitted and seen by pulmonology. He was continued on IV steroids which 

were slowly tapered per pulmonology. He also received azithromycin for likely 

bronchitis. Pt had slow improvement. TTE showed EF 55-60%. Once wheezing 

improved and weaned of supplemental O2, pt was discharged to SNF.





Discharge physical exam:


General: alert, cooperative, no distress, appears stated age


Head: normocephalic, without obvious abnormality, atraumatic


Eyes: conjunctivae/corneas clear. PERRL, EOM's intact


Throat: lips, mucosa, and tongue normal. MMM


Neck: supple, symmetrical, trachea midline, and no JVD


Lungs: clear to auscultation bilaterally


Heart: regular rate and rhythm, S1, S2 normal, no murmur, click, rub or gallop


Abdomen: soft, non-tender, non-distended, bowel sounds normal; no masses or 

organomegaly


Extremities: extremities normal, atraumatic, no cyanosis or edema


Pulses: 2+ and symmetric


Skin: skin color, texture, turgor normal; no rashes or lesions


Neurologic: grossly normal, no focal deficits





Discharge diagnoses:


1) Acute COPD exacerbation


ICD Codes:  J44.1 - Chronic obstructive pulmonary disease with (acute) 

exacerbation


SNOMED:  495512054


(2) Purulent bronchitis


ICD Codes:  J41.1 - Mucopurulent chronic bronchitis


SNOMED:  88752684


(3) HTN (hypertension)


ICD Codes:  I10 - Essential (primary) hypertension


SNOMED:  28051632


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


ICD Codes:  E11.9 - Type 2 diabetes mellitus without complications; Z79.4 - 

Long term (current) use of insulin


SNOMED:  576645433


(5) Depression


ICD Codes:  F32.9 - Major depressive disorder, single episode, unspecified





Discharge Medications


New Medications:  


Azithromycin* (Zithromax*) 250 Mg Tablet


500 MG ORAL Q24H for 3 Days, TAB





Ipratropium/Albuterol Sulfate (DuoNeb 0.5-3(2.5)mg/3ml) 3 Ml Ampul.neb


3 ML HHN Q6HRT for 14 Days, EA





Ipratropium/Albuterol Sulfate (DuoNeb 0.5-3(2.5)mg/3ml) 3 Ml Ampul.neb


3 ML HHN Q4H PRN for 30 Days, EA





 


Continued Medications:  


Albuterol Sulfate (Proventil Hfa) 6.7 Gm Hfa.aer.ad


2 PUFFS INH BID





Amantadine Hcl* (Symmetrel*) 100 Mg Capsule


100 MG ORAL DAILY





Aspirin* (Aspirin*) 81 Mg Tab.chew


81 MG ORAL DAILY, TAB





Cholecalciferol (Vitamin D3)* (Vitamin D*) 1,000 Unit Tablet


2000 UNITS ORAL DAILY, #30 TAB 0 Refills





Cyclobenzaprine Hcl (Amrix) 15 Mg Cap.er.24h


15 MG ORAL DAILY, 0 Refills





Dutasteride (Avodart) 0.5 Mg Capsule


0.5 MG ORAL QHS





Formoterol Fumarate (Perforomist) Unknown Strength Vial.neb


Unknown Dose IH BID, VIAL





Gabapentin* (Gabapentin*) 600 Mg Tablet


600 MG ORAL DAILY





Glycopyrrolate/Formoterol Fum (Bevespi Aerosphere Inhaler) 10.7 Gm Hfa.aer.ad


2 PUFFS IH BID





Insulin Aspart (Novolog Flexpen) 100 Unit/1 Ml Insuln.pen


SQ TID PRN for SLIDING SCALE





Insulin Degludec/Liraglutide (Xultophy 100 Unit-3.6MG/Ml Pen) 3 Ml Insuln.pen


40 UNITS SQ QHS, EA





Linaclotide (Linzess) 145 Mcg Capsule


145 MCG PO DAILY PRN for Constipation, CAP





Metformin Hcl* (Metformin Hcl*) 500 Mg Tablet


500 MG ORAL TWICE A DAY, TAB





Metoprolol Tartrate* (Metoprolol Tartrate*) 25 Mg Tablet


25 MG ORAL DAILY





Mirtazapine* (Remeron*) 30 Mg Tablet


30 MG ORAL BEDTIME, TAB





Omeprazole (Omeprazole) 20 Mg Capsule.dr


20 MG ORAL DAILY





Oxycodone Hcl* (Roxicodone*) 30 Mg Tablet


30 MG ORAL Q8HR PRN for FOR PAIN (SEE PT COMMENTS)


2 TABS (60MG) QAM, 2 TABS (60MG) QNOON, 1 TAB (30MG) QHS.


Roflumilast (Daliresp) 500 Mcg Tablet


500 MCG PO DAILY





Tamsulosin Hcl (Tamsulosin Hcl*) 0.4 Mg Cap.er.24h


0.8 MG ORAL BEDTIME





 


Discontinued Medications:  


Prednisone* (Prednisone*) 2.5 Mg Tablet


5 MG ORAL DAILY, #10 TAB 0 Refills





Theophylline Anhydrous (Theophylline Anhydrous) 300 Mg Tab.er.12h


300 MG PO Q12HR, TAB











Discharge


Condition Upon Discharge:  stable


Discharge Disposition


Patient was discharged to SNF/Subacute Facility(03)


Discharge Diagnoses:  











Elder Cobb M.D. Mar 20, 2018 14:49 Subjective:      Patient ID: Parviz Castellon is a 6 y.o. male.    Chief Complaint: No chief complaint on file.    HPI  ROS  Objective:     Physical Exam   Assessment:      No diagnosis found.  Plan:                 No follow-ups on file.